# Patient Record
Sex: MALE | Race: ASIAN | NOT HISPANIC OR LATINO | Employment: OTHER | URBAN - METROPOLITAN AREA
[De-identification: names, ages, dates, MRNs, and addresses within clinical notes are randomized per-mention and may not be internally consistent; named-entity substitution may affect disease eponyms.]

---

## 2021-03-09 ENCOUNTER — TRANSFERRED RECORDS (OUTPATIENT)
Dept: HEALTH INFORMATION MANAGEMENT | Facility: CLINIC | Age: 71
End: 2021-03-09

## 2021-06-15 ENCOUNTER — OFFICE VISIT (OUTPATIENT)
Dept: FAMILY MEDICINE | Facility: CLINIC | Age: 71
End: 2021-06-15
Payer: MEDICARE

## 2021-06-15 VITALS
TEMPERATURE: 97.4 F | SYSTOLIC BLOOD PRESSURE: 137 MMHG | BODY MASS INDEX: 31.22 KG/M2 | HEIGHT: 60 IN | OXYGEN SATURATION: 97 % | WEIGHT: 159 LBS | DIASTOLIC BLOOD PRESSURE: 78 MMHG | HEART RATE: 62 BPM

## 2021-06-15 DIAGNOSIS — R05.9 COUGH: ICD-10-CM

## 2021-06-15 DIAGNOSIS — J30.1 SEASONAL ALLERGIC RHINITIS DUE TO POLLEN: ICD-10-CM

## 2021-06-15 DIAGNOSIS — Z11.59 ENCOUNTER FOR SCREENING FOR OTHER VIRAL DISEASES: ICD-10-CM

## 2021-06-15 DIAGNOSIS — Z12.11 SCREEN FOR COLON CANCER: ICD-10-CM

## 2021-06-15 DIAGNOSIS — E78.5 HYPERLIPIDEMIA, UNSPECIFIED HYPERLIPIDEMIA TYPE: ICD-10-CM

## 2021-06-15 DIAGNOSIS — I10 BENIGN ESSENTIAL HYPERTENSION: Primary | ICD-10-CM

## 2021-06-15 DIAGNOSIS — M1A.00X0 CHRONIC GOUTY ARTHROPATHY: ICD-10-CM

## 2021-06-15 DIAGNOSIS — I67.9 CEREBROVASCULAR DISEASE: ICD-10-CM

## 2021-06-15 PROBLEM — M1A.9XX0 CHRONIC GOUTY ARTHROPATHY: Status: ACTIVE | Noted: 2021-06-15

## 2021-06-15 LAB
ALBUMIN SERPL BCP-MCNC: 4.2 G/DL (ref 3.5–5)
ALP SERPL-CCNC: 63 U/L (ref 45–120)
ALT SERPL W/O P-5'-P-CCNC: 55 U/L (ref 0–45)
ANION GAP SERPL CALCULATED.3IONS-SCNC: 14 MMOL/L (ref 5–18)
AST SERPL-CCNC: 30 U/L (ref 0–40)
BILIRUB SERPL-MCNC: 0.5 MG/DL (ref 0–1)
BUN SERPL-MCNC: 20 MG/DL (ref 8–22)
CALCIUM SERPL-MCNC: 9.1 MG/DL (ref 8.5–10.5)
CHLORIDE SERPL-SCNC: 106 MMOL/L (ref 98–107)
CHOLEST SERPL-MCNC: 166 MG/DL
CO2 SERPL-SCNC: 18 MMOL/L (ref 22–31)
CREAT SERPL-MCNC: 1.29 MG/DL (ref 0.7–1.3)
FASTING?: NO
GLUCOSE SERPL-MCNC: 106 MG/DL (ref 70–125)
HDLC SERPL-MCNC: 40 MG/DL
LDLC SERPL CALC-MCNC: 102 MG/DL
POTASSIUM SERPL-SCNC: 4.2 MMOL/L (ref 3.5–5)
PROT SERPL-MCNC: 7.5 G/DL (ref 6–8)
SODIUM SERPL-SCNC: 138 MMOL/L (ref 136–145)
TRIGL SERPL-MCNC: 119 MG/DL
URATE SERPL-MCNC: 7.2 MG/DL (ref 3–8)

## 2021-06-15 PROCEDURE — 36415 COLL VENOUS BLD VENIPUNCTURE: CPT | Performed by: FAMILY MEDICINE

## 2021-06-15 PROCEDURE — 99204 OFFICE O/P NEW MOD 45 MIN: CPT | Performed by: FAMILY MEDICINE

## 2021-06-15 RX ORDER — LISINOPRIL 10 MG/1
10 TABLET ORAL DAILY
Qty: 90 TABLET | Refills: 1 | Status: SHIPPED | OUTPATIENT
Start: 2021-06-15 | End: 2022-02-14

## 2021-06-15 RX ORDER — ALLOPURINOL 100 MG/1
100 TABLET ORAL DAILY
Qty: 90 TABLET | Refills: 3 | Status: SHIPPED | OUTPATIENT
Start: 2021-06-15 | End: 2022-03-16

## 2021-06-15 RX ORDER — ALBUTEROL SULFATE 90 UG/1
2 AEROSOL, METERED RESPIRATORY (INHALATION) EVERY 6 HOURS
Qty: 18 G | Refills: 1 | Status: SHIPPED | OUTPATIENT
Start: 2021-06-15 | End: 2022-10-11

## 2021-06-15 RX ORDER — FEXOFENADINE HCL 180 MG/1
180 TABLET ORAL DAILY
Qty: 90 TABLET | Refills: 3 | Status: SHIPPED | OUTPATIENT
Start: 2021-06-15 | End: 2022-11-08

## 2021-06-15 RX ORDER — ASPIRIN 81 MG/1
81 TABLET, CHEWABLE ORAL DAILY
COMMUNITY
End: 2022-11-08

## 2021-06-15 RX ORDER — ATORVASTATIN CALCIUM 20 MG/1
20 TABLET, FILM COATED ORAL DAILY
Qty: 90 TABLET | Refills: 3 | Status: SHIPPED | OUTPATIENT
Start: 2021-06-15 | End: 2022-08-29

## 2021-06-15 ASSESSMENT — MIFFLIN-ST. JEOR: SCORE: 1340.59

## 2021-06-15 NOTE — PROGRESS NOTES
Assessment and Plan     1. Benign essential hypertension  Controlled. Continue current management. Recheck in 6 months.  - lisinopril (ZESTRIL) 10 MG tablet; Take 1 tablet (10 mg) by mouth daily  Dispense: 90 tablet; Refill: 1  - Comprehensive Metabolic ([facility]) - Results > 1 hr    2. Chronic gouty arthropathy  Controlled per history. Check uric acid. Return to clinic om 12 months for recheck or sooner if needed.  - Uric Acid ([facility])  - allopurinol (ZYLOPRIM) 100 MG tablet; Take 1 tablet (100 mg) by mouth daily  Dispense: 90 tablet; Refill: 3    3. Seasonal allergic rhinitis due to pollen  Uncontrolled. Restart H1 blocker. Possibly the reason for his cough complaint. Recheck in 2-4 weeks.  - fexofenadine (ALLEGRA) 180 MG tablet; Take 1 tablet (180 mg) by mouth daily  Dispense: 90 tablet; Refill: 3    4. Cough  See above. He doesn't describe any symptoms of chronic lung disease and it doesn't sound like he has ever performed spirometry after I described the test to him. Will refill at this time but I don't know that he will need it in the future. Continue to monitor. Discussed my thoughts with him.  - albuterol (PROAIR HFA/PROVENTIL HFA/VENTOLIN HFA) 108 (90 Base) MCG/ACT inhaler; Inhale 2 puffs into the lungs every 6 hours  Dispense: 18 g; Refill: 1    5. Cerebrovascular disease  We'll see what his lipids are. With his history of TIA, he might benefit from high intensity therapy.  - atorvastatin (LIPITOR) 20 MG tablet; Take 1 tablet (20 mg) by mouth daily  Dispense: 90 tablet; Refill: 3    6. Hyperlipidemia, unspecified hyperlipidemia type  See above.  - atorvastatin (LIPITOR) 20 MG tablet; Take 1 tablet (20 mg) by mouth daily  Dispense: 90 tablet; Refill: 3  - Lipid [place] ([facility]) - Results > 1 hr    7. Screen for colon cancer  - GASTROENTEROLOGY ADULT REF PROCEDURE ONLY    8. Encounter for screening for other viral diseases  - HIV Ag/Ab Screen [place] ([facility]); Future  - [facility] Antibody  ([facility]); Future  - Hepatitis B Surface Ag ([facility]); Future    Options for treatment and follow-up care were reviewed with the patient and/or guardian. Cher Rae and/or guardian engaged in the decision making process and verbalized understanding of the options discussed and agreed with the final plan. I answered all of their questions.    George Saenz III, MD, FAAFP  [facility] Faculty  21 2:12 PM           HPI:       Cher Rae is a 71 year old  male  Patient presents with:  Establish Care  Medication Reconciliation: pr brought in medicine     Has been back forth from [place] and [place] across the years. He originally immigrated to [place]. Has been back in [place] for about a year.    Needs refills of his medicines.    Tolerating his atorvastatin well. Had a TIA previously. Also taking an aspirin.    The patient denies signs and symptoms of orthostatic hypotension.    Has not had a gout flare since . Tolerating his allopurinol.    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    Would like an allergy pill. He cannot remember which one he was taking previously. Notes that his allergies are worse since moving back to [place].    Also needs a refill of his albuterol. He only uses this once or twice every two months. Doesn't describe ever performing a spirometry. Helps his cough when it gets really bad. Otherwise denies shortness of breath, wheezing, or chest pain.    He really likes to write. Writes lots of things about [facility] history and culture. Writes in a combination of [facility] and [place].    Has 11 children (one ) and 15 grandchildren (9 in [place]).    Had his colonoscopy 9 years ago. They told him to return in 5-10 years. Did have polyps removed.    Retired.    A [facility]  was used for this visit    Already has his COVID vaccine.         PMHX:     Patient Active Problem List   Diagnosis     Chronic Gout     Hyperlipidemia     Cerebrovascular disease      Benign Polyps Of The Large Intestine     Joint Pain, Localized In The Left Shoulder     Hyperlipidemia     Blurry Vision       Current Outpatient Medications   Medication Sig Dispense Refill     albuterol (PROAIR HFA/PROVENTIL HFA/VENTOLIN HFA) 108 (90 Base) MCG/ACT inhaler Inhale 2 puffs into the lungs every 6 hours 18 g 1     allopurinol (ZYLOPRIM) 100 MG tablet Take 1 tablet (100 mg) by mouth daily 90 tablet 3     aspirin (ASA) 81 MG chewable tablet Take 81 mg by mouth daily       atorvastatin (LIPITOR) 20 MG tablet Take 1 tablet (20 mg) by mouth daily 90 tablet 3     fexofenadine (ALLEGRA) 180 MG tablet Take 1 tablet (180 mg) by mouth daily 90 tablet 3     lisinopril (ZESTRIL) 10 MG tablet Take 1 tablet (10 mg) by mouth daily 90 tablet 1       Social History     Socioeconomic History     Marital status:      Spouse name: Not on file     Number of children: Not on file     Years of education: Not on file     Highest education level: Not on file   Occupational History     Not on file   Social Needs     Financial resource strain: Not on file     Food insecurity     Worry: Not on file     Inability: Not on file     Transportation needs     Medical: Not on file     Non-medical: Not on file   Tobacco Use     Smoking status: Never Smoker     Smokeless tobacco: Never Used   Substance and Sexual Activity     Alcohol use: Not on file     Drug use: Not on file     Sexual activity: Not on file   Lifestyle     Physical activity     Days per week: Not on file     Minutes per session: Not on file     Stress: Not on file   Relationships     Social connections     Talks on phone: Not on file     Gets together: Not on file     Attends [employer] service: Not on file     Active member of club or organization: Not on file     Attends meetings of clubs or organizations: Not on file     Relationship status: Not on file     Intimate partner violence     Fear of current or ex partner: Not on file     Emotionally abused: Not on  "file     Physically abused: Not on file     Forced sexual activity: Not on file   Other Topics Concern     Not on file   Social History Narrative     Not on file       No Known Allergies    Results for orders placed or performed in visit on 06/15/21 (from the past 24 hour(s))   Uric Acid ([employer])   Result Value Ref Range    Uric Acid 7.2 3.0 - 8.0 mg/dL    Narrative    Test performed by:  [facility]  [address] [place] [address]   Lipid [place] ([employer]) - Results > 1 hr   Result Value Ref Range    Cholesterol 166 <=199 mg/dL    Triglycerides 119 <=149 mg/dL    HDL Cholesterol 40 >=40 mg/dL    LDL Cholesterol Calculated 102 <=129 mg/dL    Fasting? No     Narrative    Test performed by:  [facility]  [address] [place] [address]   Comprehensive Metabolic ([facility]) - Results > 1 hr   Result Value Ref Range    Sodium 138 136 - 145 mmol/L    Potassium 4.2 3.5 - 5.0 mmol/L    Chloride 106 98 - 107 mmol/L    CO2, Total 18 (L) 22 - 31 mmol/L    Anion Gap 14 5 - 18 mmol/L    Glucose 106 70 - 125 mg/dL    Urea Nitrogen 20 8 - 22 mg/dL    Creatinine 1.29 0.70 - 1.30 mg/dL    GFR Estimate If Black >60 >60 mL/min/1.73m2    GFR Estimate 55 (L) >60 mL/min/1.73m2    Bilirubin Total 0.5 0.0 - 1.0 mg/dL    Calcium 9.1 8.5 - 10.5 mg/dL    Protein Total 7.5 6.0 - 8.0 g/dL    Albumin 4.2 3.5 - 5.0 g/dL    Alkaline Phosphatase 63 45 - 120 U/L    AST (SGOT) 30 0 - 40 U/L    ALT (SGPT) 55 (H) 0 - 45 U/L    Narrative    Test performed by:  [facility]  [address] [place] [address]  Fasting Glucose reference range is 70-99 mg/dL per  American Diabetes Association (ADA) guidelines.            Review of Systems:     ROS         Physical Exam:     Vitals:    06/15/21 1451   BP: 137/78   Pulse: 62   Temp: 97.4  F (36.3  C)   TempSrc: Oral   SpO2: 97%   Weight: 72.1 kg (159 lb)   Height: 1.535 m (5' 0.43\")     Body " mass index is 30.61 kg/m .    Physical Exam

## 2021-06-15 NOTE — NURSING NOTE
Due to patient being non-English speaking/uses sign language, an  was used for this visit. Only for face-to-face interpretation by an external agency, date and length of interpretation can be found on the scanned worksheet.     name: jersey solis  Agency: Rebecca Dias  Language: Hmong   Telephone number:   Type of interpretation: Telephone, spoken

## 2021-06-22 ENCOUNTER — TELEPHONE (OUTPATIENT)
Dept: FAMILY MEDICINE | Facility: CLINIC | Age: 71
End: 2021-06-22

## 2021-06-22 NOTE — TELEPHONE ENCOUNTER
Called patient to help schedule his colonoscopy, patient states he would like to wait till his other insurance becomes affective. States they will call the clinic.

## 2021-09-03 ENCOUNTER — TELEPHONE (OUTPATIENT)
Dept: FAMILY MEDICINE | Facility: CLINIC | Age: 71
End: 2021-09-03

## 2021-09-03 NOTE — TELEPHONE ENCOUNTER
PT's daughter called in asking for assistance in getting her dad's referral coordinated. Please contact her back at your earliest convenience to assist. Thank you.     Referral was in regards to a Gastroenterology

## 2021-09-09 NOTE — PATIENT INSTRUCTIONS
Referral for :     Colonoscopy    LOCATION/PLACE/Provider :    [facility] Saenz   DATE & TIME :    Sept. 23rd at 12:30  PHONE :       FAX :     Appointment made by clinic staff/:    Leatha

## 2021-09-10 DIAGNOSIS — Z11.59 ENCOUNTER FOR SCREENING FOR OTHER VIRAL DISEASES: ICD-10-CM

## 2021-09-15 ENCOUNTER — APPOINTMENT (OUTPATIENT)
Dept: INTERPRETER SERVICES | Facility: CLINIC | Age: 71
End: 2021-09-15
Payer: MEDICARE

## 2021-09-20 ENCOUNTER — LAB (OUTPATIENT)
Dept: FAMILY MEDICINE | Facility: CLINIC | Age: 71
End: 2021-09-20
Payer: MEDICARE

## 2021-09-20 DIAGNOSIS — Z20.822 COVID-19 RULED OUT: Primary | ICD-10-CM

## 2021-09-20 DIAGNOSIS — Z20.822 COVID-19 RULED OUT: ICD-10-CM

## 2021-09-20 PROCEDURE — U0005 INFEC AGEN DETEC AMPLI PROBE: HCPCS

## 2021-09-20 PROCEDURE — U0003 INFECTIOUS AGENT DETECTION BY NUCLEIC ACID (DNA OR RNA); SEVERE ACUTE RESPIRATORY SYNDROME CORONAVIRUS 2 (SARS-COV-2) (CORONAVIRUS DISEASE [COVID-19]), AMPLIFIED PROBE TECHNIQUE, MAKING USE OF HIGH THROUGHPUT TECHNOLOGIES AS DESCRIBED BY CMS-2020-01-R: HCPCS

## 2021-09-21 LAB — SARS-COV-2 RNA RESP QL NAA+PROBE: NEGATIVE

## 2021-09-22 ENCOUNTER — TELEPHONE (OUTPATIENT)
Dept: FAMILY MEDICINE | Facility: CLINIC | Age: 71
End: 2021-09-22

## 2021-09-22 ENCOUNTER — ANESTHESIA EVENT (OUTPATIENT)
Dept: SURGERY | Facility: AMBULATORY SURGERY CENTER | Age: 71
End: 2021-09-22
Payer: MEDICARE

## 2021-09-22 RX ORDER — OMEPRAZOLE 40 MG/1
CAPSULE, DELAYED RELEASE ORAL
COMMUNITY
End: 2022-11-08

## 2021-09-22 RX ORDER — LISINOPRIL 10 MG/1
TABLET ORAL
COMMUNITY
End: 2022-03-16

## 2021-09-22 RX ORDER — LORATADINE 10 MG/1
TABLET ORAL EVERY 24 HOURS
COMMUNITY
End: 2022-11-08

## 2021-09-22 RX ORDER — ATORVASTATIN CALCIUM 20 MG/1
TABLET, FILM COATED ORAL
COMMUNITY
End: 2022-03-16

## 2021-09-22 NOTE — TELEPHONE ENCOUNTER
Called patient and daughters number, left message on both VM. Trying to confirm patient colonoscopy appointment for tomorrow.

## 2021-09-23 ENCOUNTER — HOSPITAL ENCOUNTER (OUTPATIENT)
Facility: AMBULATORY SURGERY CENTER | Age: 71
End: 2021-09-23
Attending: FAMILY MEDICINE
Payer: MEDICARE

## 2021-09-23 ENCOUNTER — ANESTHESIA (OUTPATIENT)
Dept: SURGERY | Facility: AMBULATORY SURGERY CENTER | Age: 71
End: 2021-09-23
Payer: MEDICARE

## 2021-09-23 VITALS
WEIGHT: 152.5 LBS | RESPIRATION RATE: 16 BRPM | HEIGHT: 60 IN | OXYGEN SATURATION: 97 % | DIASTOLIC BLOOD PRESSURE: 82 MMHG | BODY MASS INDEX: 29.94 KG/M2 | HEART RATE: 65 BPM | TEMPERATURE: 96 F | SYSTOLIC BLOOD PRESSURE: 141 MMHG

## 2021-09-23 PROCEDURE — 45378 DIAGNOSTIC COLONOSCOPY: CPT | Performed by: FAMILY MEDICINE

## 2021-09-23 RX ORDER — OXYCODONE HYDROCHLORIDE 5 MG/1
5 TABLET ORAL EVERY 4 HOURS PRN
Status: DISCONTINUED | OUTPATIENT
Start: 2021-09-23 | End: 2021-09-24 | Stop reason: HOSPADM

## 2021-09-23 RX ORDER — ONDANSETRON 2 MG/ML
INJECTION INTRAMUSCULAR; INTRAVENOUS PRN
Status: DISCONTINUED | OUTPATIENT
Start: 2021-09-23 | End: 2021-09-23

## 2021-09-23 RX ORDER — LIDOCAINE HYDROCHLORIDE 20 MG/ML
INJECTION, SOLUTION INFILTRATION; PERINEURAL PRN
Status: DISCONTINUED | OUTPATIENT
Start: 2021-09-23 | End: 2021-09-23

## 2021-09-23 RX ORDER — PROPOFOL 10 MG/ML
INJECTION, EMULSION INTRAVENOUS CONTINUOUS PRN
Status: DISCONTINUED | OUTPATIENT
Start: 2021-09-23 | End: 2021-09-23

## 2021-09-23 RX ORDER — FENTANYL CITRATE 50 UG/ML
25 INJECTION, SOLUTION INTRAMUSCULAR; INTRAVENOUS
Status: DISCONTINUED | OUTPATIENT
Start: 2021-09-23 | End: 2021-09-24 | Stop reason: HOSPADM

## 2021-09-23 RX ORDER — SODIUM CHLORIDE, SODIUM LACTATE, POTASSIUM CHLORIDE, CALCIUM CHLORIDE 600; 310; 30; 20 MG/100ML; MG/100ML; MG/100ML; MG/100ML
INJECTION, SOLUTION INTRAVENOUS CONTINUOUS
Status: DISCONTINUED | OUTPATIENT
Start: 2021-09-23 | End: 2021-09-24 | Stop reason: HOSPADM

## 2021-09-23 RX ORDER — LIDOCAINE 40 MG/G
CREAM TOPICAL
Status: DISCONTINUED | OUTPATIENT
Start: 2021-09-23 | End: 2021-09-24 | Stop reason: HOSPADM

## 2021-09-23 RX ORDER — ONDANSETRON 4 MG/1
4 TABLET, ORALLY DISINTEGRATING ORAL EVERY 30 MIN PRN
Status: DISCONTINUED | OUTPATIENT
Start: 2021-09-23 | End: 2021-09-24 | Stop reason: HOSPADM

## 2021-09-23 RX ORDER — DEXAMETHASONE SODIUM PHOSPHATE 4 MG/ML
INJECTION, SOLUTION INTRA-ARTICULAR; INTRALESIONAL; INTRAMUSCULAR; INTRAVENOUS; SOFT TISSUE PRN
Status: DISCONTINUED | OUTPATIENT
Start: 2021-09-23 | End: 2021-09-23

## 2021-09-23 RX ORDER — ALLOPURINOL 100 MG/1
100 TABLET ORAL DAILY
COMMUNITY
End: 2022-03-16

## 2021-09-23 RX ORDER — FENTANYL CITRATE 50 UG/ML
25 INJECTION, SOLUTION INTRAMUSCULAR; INTRAVENOUS EVERY 5 MIN PRN
Status: DISCONTINUED | OUTPATIENT
Start: 2021-09-23 | End: 2021-09-24 | Stop reason: HOSPADM

## 2021-09-23 RX ORDER — MEPERIDINE HYDROCHLORIDE 25 MG/ML
12.5 INJECTION INTRAMUSCULAR; INTRAVENOUS; SUBCUTANEOUS
Status: DISCONTINUED | OUTPATIENT
Start: 2021-09-23 | End: 2021-09-24 | Stop reason: HOSPADM

## 2021-09-23 RX ORDER — ONDANSETRON 2 MG/ML
4 INJECTION INTRAMUSCULAR; INTRAVENOUS EVERY 30 MIN PRN
Status: DISCONTINUED | OUTPATIENT
Start: 2021-09-23 | End: 2021-09-24 | Stop reason: HOSPADM

## 2021-09-23 RX ADMIN — PROPOFOL 200 MCG/KG/MIN: 10 INJECTION, EMULSION INTRAVENOUS at 12:45

## 2021-09-23 RX ADMIN — LIDOCAINE HYDROCHLORIDE 60 ML: 20 INJECTION, SOLUTION INFILTRATION; PERINEURAL at 12:45

## 2021-09-23 RX ADMIN — DEXAMETHASONE SODIUM PHOSPHATE 4 MG: 4 INJECTION, SOLUTION INTRA-ARTICULAR; INTRALESIONAL; INTRAMUSCULAR; INTRAVENOUS; SOFT TISSUE at 12:47

## 2021-09-23 RX ADMIN — ONDANSETRON 4 MG: 2 INJECTION INTRAMUSCULAR; INTRAVENOUS at 12:47

## 2021-09-23 RX ADMIN — SODIUM CHLORIDE, SODIUM LACTATE, POTASSIUM CHLORIDE, CALCIUM CHLORIDE: 600; 310; 30; 20 INJECTION, SOLUTION INTRAVENOUS at 12:21

## 2021-09-23 ASSESSMENT — MIFFLIN-ST. JEOR: SCORE: 1308.21

## 2021-09-23 NOTE — LETTER
10/05/21      Cher Rae  [address]  [place] [address]    : 1950    Cher Rae,    The results from your colonoscopy showed no polyps and some internal hemorrhoids. Based on these results, current guidelines recommend repeat exam in 10 years for screening colonoscopy (in the year ).    Thank you coming to see me for your colonoscopy. Please let me know if there is any further information that you need.    Dr. Letty Saenz III, MD, FAAFP  Faculty Physician, [facility]    Department of Family Medicine and Community Health  [employer] [facility] [employer]    Office: [phone]

## 2021-09-23 NOTE — ANESTHESIA PREPROCEDURE EVALUATION
Anesthesia Pre-Procedure Evaluation    Patient: Cher Rae   MRN: [id number] : 1950        Preoperative Diagnosis: Colon cancer screening [Z12.11]   Procedure : Procedure(s):  COLONOSCOPY     Past Medical History:   Diagnosis Date     Anemia      Cerebral artery occlusion with cerebral infarction (H)     No residual from stroke     Gastroesophageal reflux disease      Hypertension       Past Surgical History:   Procedure Laterality Date     COLONOSCOPY        No Known Allergies   Social History     Tobacco Use     Smoking status: Never Smoker     Smokeless tobacco: Never Used   Substance Use Topics     Alcohol use: Yes      Wt Readings from Last 1 Encounters:   21 69.2 kg (152 lb 8 oz)        Anesthesia Evaluation   Pt has had prior anesthetic. Type: MAC.    No history of anesthetic complications       ROS/MED HX  ENT/Pulmonary:  - neg pulmonary ROS     Neurologic:  - neg neurologic ROS     Cardiovascular:     (+) hypertension-----    METS/Exercise Tolerance:     Hematologic:  - neg hematologic  ROS     Musculoskeletal:  - neg musculoskeletal ROS     GI/Hepatic:     (+) GERD,     Renal/Genitourinary:  - neg Renal ROS     Endo:  - neg endo ROS     Psychiatric/Substance Use:  - neg psychiatric ROS     Infectious Disease:  - neg infectious disease ROS     Malignancy:       Other:            Physical Exam    Airway        Mallampati: II   TM distance: > 3 FB   Neck ROM: full     Respiratory Devices and Support         Dental           Cardiovascular          Rhythm and rate: regular and normal     Pulmonary           breath sounds clear to auscultation           OUTSIDE LABS:  CBC: No results found for: WBC, HGB, HCT, PLT  BMP: No results found for: NA, POTASSIUM, CHLORIDE, CO2, BUN, CR, GLC  COAGS: No results found for: PTT, INR, FIBR  POC: No results found for: BGM, HCG, HCGS  HEPATIC: No results found for: ALBUMIN, PROTTOTAL, ALT, AST, GGT, ALKPHOS, BILITOTAL, BILIDIRECT, TIA  OTHER: No results  found for: PH, LACT, A1C, MADONNA, PHOS, MAG, LIPASE, AMYLASE, TSH, T4, T3, CRP, SED    Anesthesia Plan    ASA Status:  2      Anesthesia Type: MAC.              Consents    Anesthesia Plan(s) and associated risks, benefits, and realistic alternatives discussed. Questions answered and patient/representative(s) expressed understanding.     - Discussed with:  Patient         Postoperative Care            Comments:                Rosa Patel MD

## 2021-09-23 NOTE — ANESTHESIA POSTPROCEDURE EVALUATION
Patient: Cher Rae    Procedure(s):  COLONOSCOPY    Diagnosis:Colon cancer screening [Z12.11]  Diagnosis Additional Information: No value filed.    Anesthesia Type:  MAC    Note:  Disposition: Outpatient   Postop Pain Control: Uneventful            Sign Out: Well controlled pain   PONV: No   Neuro/Psych: Uneventful            Sign Out: Acceptable/Baseline neuro status   Airway/Respiratory: Uneventful            Sign Out: Acceptable/Baseline resp. status   CV/Hemodynamics: Uneventful            Sign Out: Acceptable CV status; No obvious hypovolemia; No obvious fluid overload   Other NRE:    DID A NON-ROUTINE EVENT OCCUR?            Last vitals:  Vitals Value Taken Time   /76 09/23/21 1400   Temp 96  F (35.6  C) 09/23/21 1352   Pulse 65 09/23/21 1408   Resp 16 09/23/21 1400   SpO2 97 % 09/23/21 1408   Vitals shown include unvalidated device data.    Electronically Signed By: Rosa Patel MD  September 23, 2021  2:12 PM

## 2021-09-23 NOTE — OP NOTE
COLONOSCOPY OPERATION REPORT     OPERATION PERFORMED:  Colonoscopy with MAC  DATE OF OPERATION: 23 September 2021  SURGEON(S): George Saenz III, MD, FAAFP    ASSISTANT(S): Marie Jaimes MD    Preoperative Diagnosis: Encounter for colonoscopy following colon polyp removal [Z09, Z86.010]    Postoperative Diagnosis: Internal Hemorrhoids      History:    70yom here for repeat colonoscopy. States he had a colonoscopy greater than 10 years ago and that there were polyps. Maybe he was told to follow up in 5-10. Denies symptoms. No family history of colon cancer.        Shortly Before Procedure  Time out was completed. Correct patient ID, procedure verified, positioning verified, implants/equipment available, studies available as needed. Consents signed and on the chart. Emergency resuscitation equipment available if needed. TYPE OF ANESTHESIA:  MAC. Patient monitored in the usual fashion with pulse ox, NIBP, and EKG. See flowsheet for full details.      DESCRIPTION OF OPERATION:  Assuring patient comfort, a rectal exam revealed normal sphincter tone, no masses, no stool in the rectal vault. The video colonoscope was passed from anus to cecum under direct visualization with expected amount of difficulty. The cecum was identified by the ileocecal valve, appendiceal orifice, and crows foot. Mucosa was inspected > 6 minute scope withdrawal.         Findings   Prep:    Excellent   EBL:                     < 5 mL   Terminal Ileum: Normal appearance   Cecum:                     Normal appearance.   Ascending Colon:    Mild diverticulosis       Transverse Colon:   Normal appearance.        Desc. Colon: Normal appearance.       Sigmoid Colon:        Moderate diverticulosis        Rectum:                    Moderate internal hemorrhoids       Complications: None.     Polyps submitted:                     0   Diverticulosis:    None   Internal Hemorrhoids: Moderate   External Hemorrhoids: None       TIMES time  min    PROCEDURE START 1249 TOTAL PROCEDURE TIME 57   CECUM REACHED 1255 TIME TO CECUM 6   PROCEDURE STOP 1346 WITHDRAWAL TIME 51       DISPOSITION   Follow up/Repeat Colonoscopy:                     Repeat colonoscopy in 10 years.             George Saenz III, MD, FAAFP

## 2021-09-23 NOTE — DISCHARGE INSTRUCTIONS
Colonoscopy Discharge Instructions  When You Leave Today:  The medications you received today may affect your short-term memory, balance/coordination, and reflexes. It may cause drowsiness, slow reflexes, and impaired thoughts. We recommend that you go home and rest for the remainder of the day. Do NOT DRIVE, go back to work or do anything that requires a clear thought process. Examples include but are not limited to: Do NOT drive, operate any machines, make important personal or work decisions, or sign legal documentation for 24 hours.      You may feel bloated because of the air that was introduced during the exam. Flatulence and belching is expected. If you feel like the air isn't coming out easily you can try laying on your right side to help the air move in the correct direction. Holding in the air can cause abdominal pain, cramping, and in some cases nausea. Let the gas pass!!    You may eat and drink what you can tolerate after your exam.  We recommend that you start with something light and progress as tolerated. You will be gassy, so avoiding things that create more gas is beneficial. This includes things like beans, carbonated beverages, and whole vegetables like broccoli/brussel sprouts/cabbage.     AVOID alcohol for 24 hours. This could have an adverse reaction mixed with the sedative.       You may resume your normal home medications unless told otherwise. Do not double up on any missed doses.        Avoid Aspirin and Aspirin containing products for 3 days if biopsies were taken. This will allow the areas to heal and will decrease your risk of bleeding.      Watch the area where your IV was inserted. If there is any swelling, severe pain or the area feels hot, place a warm, wet cloth over the area for 10-20 minutes. A small bruise is normal. If you continue to have discomfort, contact your doctor.      If you use a CPAP at home, please use it for the next 24-48 hours.    If any of the following  occur, please go to your CLOSEST EMERGENCY ROOM.      Increasing abdominal pain (if you are unable to pass gas or doubled over in pain)    Fever of 101.5 degrees or higher    Bleeding (a small amount of bleeding is normal when wiping if you have hemorrhoids or we remove tissue samples. However if you are filling the toilet with bright red blood, this is an emergency)    Call the [facility] [phone] if you have any questions or concerns regarding your procedure.  The [facility] is open from 8am to 5pm Monday through Friday.  DO not come to the clinic for severe post procedure complications, go to your closest ER.     Colonoscopy:     Biopsies Taken   X Normal- Follow up__10 years___ (unless condition changes or treatment guidelines change)   X Diverticulosis   X Hemorrhoids X Internal  External   X High Fiber Diet           Other _______________________________________________________________    If biopsies were taken:  A letter will be mailed to you with your biopsy results and further recommendations in approximately   1-2 weeks.  If you have not received a letter within 3 weeks, please call the [facility] [phone].

## 2021-09-23 NOTE — ANESTHESIA CARE TRANSFER NOTE
Patient: Cher Rae    Procedure(s):  COLONOSCOPY    Diagnosis: Colon cancer screening [Z12.11]  Diagnosis Additional Information: No value filed.    Anesthesia Type:   MAC     Note:    Oropharynx: oropharynx clear of all foreign objects  Level of Consciousness: awake and drowsy  Oxygen Supplementation: face mask  Level of Supplemental Oxygen (L/min / FiO2): 8  Independent Airway: airway patency satisfactory and stable  Dentition: dentition unchanged  Vital Signs Stable: post-procedure vital signs reviewed and stable  Report to RN Given: handoff report given  Patient transferred to: Phase II    Handoff Report: Identifed the Patient, Identified the Reponsible Provider, Reviewed the pertinent medical history, Discussed the surgical course, Reviewed Intra-OP anesthesia mangement and issues during anesthesia, Set expectations for post-procedure period and Allowed opportunity for questions and acknowledgement of understanding      Vitals:  Vitals Value Taken Time   /73 09/23/21 1352   Temp 96  F (35.6  C) 09/23/21 1352   Pulse 60    Resp 16 09/23/21 1352   SpO2 100 % 09/23/21 1352       Electronically Signed By: SEN Rondon CRNA  September 23, 2021  1:55 PM

## 2021-09-28 ENCOUNTER — OFFICE VISIT (OUTPATIENT)
Dept: FAMILY MEDICINE | Facility: CLINIC | Age: 71
End: 2021-09-28
Payer: MEDICARE

## 2021-09-28 VITALS
BODY MASS INDEX: 31.03 KG/M2 | HEART RATE: 67 BPM | HEIGHT: 60 IN | SYSTOLIC BLOOD PRESSURE: 121 MMHG | WEIGHT: 158.08 LBS | OXYGEN SATURATION: 98 % | RESPIRATION RATE: 16 BRPM | DIASTOLIC BLOOD PRESSURE: 77 MMHG | TEMPERATURE: 97.9 F

## 2021-09-28 DIAGNOSIS — Z23 NEED FOR PROPHYLACTIC VACCINATION AND INOCULATION AGAINST INFLUENZA: ICD-10-CM

## 2021-09-28 DIAGNOSIS — Z00.00 MEDICARE ANNUAL WELLNESS VISIT, INITIAL: Primary | ICD-10-CM

## 2021-09-28 DIAGNOSIS — Z00.00 WELLNESS EXAMINATION: Primary | ICD-10-CM

## 2021-09-28 PROCEDURE — 99207 PR NO BILLABLE SERVICE THIS VISIT: CPT

## 2021-09-28 PROCEDURE — 90662 IIV NO PRSV INCREASED AG IM: CPT | Performed by: FAMILY MEDICINE

## 2021-09-28 PROCEDURE — 90472 IMMUNIZATION ADMIN EACH ADD: CPT | Performed by: FAMILY MEDICINE

## 2021-09-28 PROCEDURE — 90471 IMMUNIZATION ADMIN: CPT | Performed by: FAMILY MEDICINE

## 2021-09-28 PROCEDURE — 99397 PER PM REEVAL EST PAT 65+ YR: CPT | Mod: 25 | Performed by: FAMILY MEDICINE

## 2021-09-28 PROCEDURE — 90732 PPSV23 VACC 2 YRS+ SUBQ/IM: CPT | Performed by: FAMILY MEDICINE

## 2021-09-28 ASSESSMENT — MIFFLIN-ST. JEOR: SCORE: 1333.3

## 2021-09-28 NOTE — PATIENT INSTRUCTIONS
PERSONAL PREVENTIVE SERVICES PLAN - SERVICES     Review these tests with your medical staff then decide which ones you want and take this page home for your reference      SCREENING TESTS     Description   Year of Last Screening   Recommended Today?   Heart disease screening blood tests    Cholesterol level Reducing cholesterol can reduce your risk of heart attacks by 25%.  Screening is recommended yearly if you are at risk of heart disease otherwise every 4-5 years 6/15/2021 No: is not indicated today.   Diabetes screening tests    Hemoglobin A1c blood test   Finding and treating diabetes early can reduce complications.  Screening recommended/covered yearly if you have high blood pressure, high cholesterol, obesity (BMI >30), or a history of high blood glucose tests; or 2 of the following: family history of diabetes, overweight (BMI >25 but <30), age 65 years or older, and a history of diabetes of pregnancy or gave birth to baby weighing more than 9 lbs. 6/15/2021  CMP,glucose- 106 No: is not indicated today.   Hepatitis B screening Finding hepatitis B early can reduce complications.  Screening is recommended for persons with selected risk factors.  No: is not indicated today.   Hepatitis C screening Finding hepatitis C early can reduce complications.  Screening is recommended for all persons born from 1945 through 1965 and for those with selected other risk factors.   Yes; Recommended    HIV screening Finding HIV early can reduce complications.  Screening is recommended for persons with risk factors for HIV infection.  No: is not indicated today.   Glaucoma screening Early detection of glaucoma can prevent blindness.   Please talk to your eye doctor about this.       SCREENING TESTS     Description   Year of Last Screening   Recommended Today?   Colorectal cancer screening    Fecal occult blood test     Screening colonoscopy Screening for colon cancer has been shown to reduce death from colon cancer by 25-30%.  Screening recommended to start at 50 years and continuing until age 75 years.    Yes; Recommended. 6/22/21 patient wanted to check with insurance coverage first.   Breast Cancer Screening (women)    Mammogram Mammogram screening for breast cancer has been shown to reduce the risk of dying from breast cancer and prolong life. Screening is recommended every 1-2 years for women aged 50 to 74 years.   No: is not indicated today.   Cervical Cancer screening (women)    Pap Cervical pap smears can reduce cervical cancer. Screening is recommended annually if high risk (history of abnormal pap smears) otherwise every 2-3 years, stop screening at 65 years of age if history of normal paps.  No: is not indicated today.   Screening for Osteoporosis:  Bone mass measurements (women)    Dexa Scan Screening and treating Osteoporosis can reduce the risk of hip and spine fractures. Screening is recommended in women 65 years or older and in women and men at risk of osteoporosis.  No: is not indicated today.   Screening for Lung Cancer     Low-dose CT scanning Screening can reduce mortality in persons aged 55-80 who have smoked at least 30 pack-years and who are either still smoking or have quit in the past 15 years.  No: is not indicated today.   Abdominal Aortic Aneurysm (AAA) screening    Ultrasound (US)   An aneurysm treated before rupture is very safe -a ruptured aneurysm can be fatal.  Screening  by US for AAA is limited to patients who meet one of the following criteria:    Men who are 65-75 years old and have smoked more than 100 cigarettes in their lifetime    Anyone with a family history of abdominal aortic aneurysm  No: is not indicated today.     Here are your recommended immunizations.  Take this home for your reference.                                                    IMMUNIZATIONS Description Recommend today?     Influenza (Flu shot) Prevents flu; should get every year Yes; Recommended    PCV 13 Pneumonia vaccination;  you get it once Yes; Recommended    PPSV 23 Second pneumonia vaccination; usually get it 1 year after PCV 13 No: is not indicated today.   Zoster (Shingles) Prevents shingles; you get it once  (Check with Part D insurance for coverage, must receive at a pharmacy, not clinic) Yes; Recommended    Tetanus Prevents tetanus; once every 10 years No; is up to date.     Hepatitis B  If you have any of the following risk factors you should be immunized for hepatitis B: severe kidney disease, people who live in the same house as a carrier of Hepatitis B virus, people who live in  institutions (e.g. nursing homes or group homes), homosexual men, patients with hemophilia who received Factor VIII or IX concentrates, abusers of illicit injectable drugs No: is not indicated today.      PATIENT INSTRUCTIONS    Yearly exam:     See your health care provider every year in order to review changes in your health, review medicines that you take, and discuss preventive care needs such as immunizations and cancer screening.    Get a flu shot each year.     Advance Directives:    If you have not done so, you are encouraged to complete advance directives and/or a living will.   More information about advance directives can be found at: http://www.[place]med.org/advocacy/Key-Issues/Advance-Directives    Nutrition:     Eat at least 5 servings of fruits and vegetables each day.     Eat whole-grain bread, whole-wheat pasta and brown rice instead of white grains and rice.     Talk to your doctor about Calcium and Vitamin D.     Lifestyle:    Exercise for at least 150 minutes a week (30 minutes a day, 5 days a week). This will help you control your weight and prevent disease.     Limit alcohol to one drink per day.     If you smoke, try to quit - your doctor will be happy to help.     Wear sunscreen to prevent skin cancer.     See your dentist every six months for an exam and cleaning.     See your eye doctor every 1 to 2 years to screen for  conditions such as glaucoma, macular degeneration and cataracts.

## 2021-09-28 NOTE — NURSING NOTE
Due to patient being non-English speaking/uses sign language, an  was used for this visit. Only for face-to-face interpretation by an external agency, date and length of interpretation can be found on the scanned worksheet.     name: Pamela martin  Agency: [employer]  Language: Cem   Telephone number: [phone]  Type of interpretation: Face-to-face, spoken

## 2021-09-28 NOTE — PROGRESS NOTES
Annual Wellness Visit for 65 years and older    HPI  This 71 year old male presents as an established patient  George Saenz who presents for an annual wellness visit.    Other issues patient wants to be addressed today:  Chief Complaint   Patient presents with     Wellness Visit     Medication Reconciliation     Imm/Inj     Flu Shot       Patient Active Problem List   Diagnosis     Chronic Gout     Hyperlipidemia     Cerebrovascular disease     Benign Polyps Of The Large Intestine     Joint Pain, Localized In The Left Shoulder     Hyperlipidemia     Blurry Vision     History reviewed. No pertinent past medical history.    History reviewed. No pertinent family history.  Problem List, Family History and past Medical History reviewed and  unchanged/updated.    Nursing Notes:   Melanie Mansfield, [facility]  9/28/2021  3:48 PM  Signed  Due to patient being non-English speaking/uses sign language, an  was used for this visit. Only for face-to-face interpretation by an external agency, date and length of interpretation can be found on the scanned worksheet.     name: Pamela martin  Agency: [employer]  Language: [facility]   Telephone number: [phone]  Type of interpretation: Face-to-face, spoken          Are you sexually active?  Yes    If yes, with men, women, or both? Female  If yes, do you more than one current partner?No  If yes, are you using condoms?  No  Have you had any sexually transmitted infections? No   Any sexual concerns? No       Frailty Assessment    1. Weight loss (>5% in year)  No  Wt Readings from Last 5 Encounters:   09/28/21 71.7 kg (158 lb 1.3 oz)   06/15/21 72.1 kg (159 lb)       2. Exhaustion (perceived effort for a given activity)   How difficult is walking from one room to the other on the same level?not   No     3. Weakness (handgrip strength)   How difficult is lifting or carrying something as heavy as 10 pounds? mildly   No    4. Decreased physical activity    Compared with most  (men/women) your age, would you say  that you are more active, less active, or about the same? same   No    5. Slow gait speed (timed up and go > 12 sec.)  Yes  FALL RISK ASSESSMENT 9/28/2021   Fallen 2 or more times in the past year? No   Any fall with injury in the past year? No   Timed Up and Go Test/Seconds (13.5 is a fall risk; contact physician) 13         Frailty screen score: 1    Frail Assessment:1-2 Prefrail    EVALUATION OF COGNITIVE FUNCTION  Mood/affect:Normal  Appearance:Normal  Family member/caregiver input: N/A, in clinic alone    PHQ-2 Score:   PHQ-2 ( 1999 Pfizer) 9/28/2021   Q1: Little interest or pleasure in doing things 0   Q2: Feeling down, depressed or hopeless 0   PHQ-2 Score 0       PHQ-9 Score:   No flowsheet data found.    Mini Cog Test:  Unable to complete due to language barrier. No concerns about his memory given his interactions and history.    Cognitive screen is:Negative    Other Assessments:  CV Risk based on Pooled Cohort Risk   The 10-year ASCVD risk score (Greyson MORGAN Jr., et al., 2013) is: 17.9%    Values used to calculate the score:      Age: 71 years      Sex: Male      Is Non- : No      Diabetic: No      Tobacco smoker: No      Systolic Blood Pressure: 121 mmHg      Is BP treated: Yes      HDL Cholesterol: 40 mg/dL      Total Cholesterol: 166 mg/dL    Colon CA Screening (>50-75 ) (FITT annually or colonoscopy every 10years):  Date done 23 Sep 21  Result(s) Normal, repeat in 10    ECG (if done)not performed    Corrected Visual acuity: N/A    Advance Directives: Discussed with patient and family as appropriate. Has patient completed advance directives and/or a living will? No - worksheet given by RN.      Immunization History   Administered Date(s) Administered     COVID-19,PF,Moderna 02/13/2021, 03/13/2021     FLU 6-35 months 12/03/2009     Flu, Unspecified 12/03/2009, 10/28/2010     Influenza (H1N1) 01/22/2010     Influenza (IIV3) PF 10/28/2010,  "10/09/2012, 09/19/2013     Influenza Vaccine, 6+MO IM (QUADRIVALENT W/PRESERVATIVES) 10/09/2012, 09/19/2013     Influenza, Quad, High Dose, Pf, 65yr+ (Fluzone HD) 10/16/2020, 09/28/2021     Pneumococcal 23 valent 09/28/2021     Tdap (Adacel,Boostrix) 10/31/2013     Reviewed Immunization Record Today  Physical Exam  Vitals: /77 (BP Location: Right arm, Cuff Size: Adult Regular)   Pulse 67   Temp 97.9  F (36.6  C) (Oral)   Resp 16   Ht 1.53 m (5' 0.24\")   Wt 71.7 kg (158 lb 1.3 oz)   SpO2 98%   BMI 30.63 kg/m    BMI= Body mass index is 30.63 kg/m .  EXAM:  Constitutional: healthy, alert and no distress     Assessment and Plan:  Reviewed Preventive Services and Plan form with patient as specified in  Patient Instructions.  Positive findings on assessment: Just completed colonoscopy with me last week.   Cher was seen today for wellness visit, medication reconciliation and imm/inj.    Diagnoses and all orders for this visit:    Medicare annual wellness visit, initial  -     Pneumococcal vaccine 23 valent PPSV23  (Pneumovax) [95411]    Need for prophylactic vaccination and inoculation against influenza  -     INFLUENZA, QUAD, HIGH DOSE, PF, 65YR + (FLUZONE HD)        Options for treatment and follow-up care were reviewed with the Cher ARCE Rae and/or guardian engaged in the decision making process and verbalized understanding of the options discussed and agreed with the final plan.      George Saenz III, MD, FAAFP  [facility] Faculty  09/30/21 1:44 PM    "

## 2021-10-05 NOTE — PROGRESS NOTES
10/05/21    RE: CHER MERINO, : 1950    George Saenz    I have performed the colonoscopy on Cher Merino and found no polyps and some internal hemorrhoids. Based on the pathology, current guidelines recommend repeat exam in 10 years for screening colonoscopy (in the year ).    Your patient has been informed of the results and recommended follow up plan.    Thank you for the referral. Please let me know if there is any further information that you need.    Very Respectfully,    Yash Saenz III, MD, FAAFP  Faculty Physician, [facility]    Department of Family Medicine and Community Health  [employer] [facility] [employer]    Office: [phone]

## 2021-11-09 ENCOUNTER — IMMUNIZATION (OUTPATIENT)
Dept: FAMILY MEDICINE | Facility: CLINIC | Age: 71
End: 2021-11-09
Payer: MEDICARE

## 2021-11-09 PROCEDURE — 91306 COVID-19,PF,MODERNA (18+ YRS BOOSTER .25ML): CPT

## 2021-11-09 PROCEDURE — 0064A COVID-19,PF,MODERNA (18+ YRS BOOSTER .25ML): CPT

## 2022-02-12 DIAGNOSIS — I10 BENIGN ESSENTIAL HYPERTENSION: ICD-10-CM

## 2022-02-14 RX ORDER — LISINOPRIL 10 MG/1
TABLET ORAL
Qty: 45 TABLET | Refills: 0 | Status: SHIPPED | OUTPATIENT
Start: 2022-02-14 | End: 2022-03-16

## 2022-02-14 NOTE — TELEPHONE ENCOUNTER
Patient due for follow up next month. Small refill provided. Please schedule.    George Saenz III, MD, FAAFP  [facility] Residency Faculty  02/14/22 9:43 AM

## 2022-02-15 NOTE — TELEPHONE ENCOUNTER
Marcos DELANEY:  Called pt and informed of appt needed. Scheduled as requested.  Demetra Martínez, EMT  February 15, 2022  1:55 PM

## 2022-03-16 ENCOUNTER — OFFICE VISIT (OUTPATIENT)
Dept: FAMILY MEDICINE | Facility: CLINIC | Age: 72
End: 2022-03-16
Payer: MEDICARE

## 2022-03-16 VITALS
TEMPERATURE: 98.2 F | RESPIRATION RATE: 18 BRPM | DIASTOLIC BLOOD PRESSURE: 85 MMHG | HEIGHT: 60 IN | BODY MASS INDEX: 32.2 KG/M2 | SYSTOLIC BLOOD PRESSURE: 143 MMHG | OXYGEN SATURATION: 98 % | HEART RATE: 65 BPM | WEIGHT: 164 LBS

## 2022-03-16 DIAGNOSIS — J30.1 SEASONAL ALLERGIC RHINITIS DUE TO POLLEN: ICD-10-CM

## 2022-03-16 DIAGNOSIS — M1A.00X0 CHRONIC GOUTY ARTHROPATHY: ICD-10-CM

## 2022-03-16 DIAGNOSIS — I10 BENIGN ESSENTIAL HYPERTENSION: Primary | ICD-10-CM

## 2022-03-16 PROCEDURE — 99215 OFFICE O/P EST HI 40 MIN: CPT | Performed by: FAMILY MEDICINE

## 2022-03-16 RX ORDER — AZELASTINE HYDROCHLORIDE 0.5 MG/ML
SOLUTION/ DROPS OPHTHALMIC
COMMUNITY
End: 2022-03-16

## 2022-03-16 RX ORDER — AZELASTINE HYDROCHLORIDE 0.5 MG/ML
1 SOLUTION/ DROPS OPHTHALMIC 2 TIMES DAILY PRN
Qty: 6 ML | Refills: 11 | Status: SHIPPED | OUTPATIENT
Start: 2022-03-16 | End: 2023-03-22

## 2022-03-16 RX ORDER — ALLOPURINOL 100 MG/1
200 TABLET ORAL DAILY
Qty: 180 TABLET | Refills: 3 | Status: SHIPPED | OUTPATIENT
Start: 2022-03-16 | End: 2022-11-08

## 2022-03-16 RX ORDER — FLUTICASONE PROPIONATE 50 MCG
SPRAY, SUSPENSION (ML) NASAL
COMMUNITY
End: 2023-03-22

## 2022-03-16 RX ORDER — LISINOPRIL 10 MG/1
10 TABLET ORAL DAILY
Qty: 90 TABLET | Refills: 1 | Status: SHIPPED | OUTPATIENT
Start: 2022-03-16 | End: 2022-09-22

## 2022-03-16 RX ORDER — COLCHICINE 0.6 MG/1
0.6 TABLET ORAL DAILY
Qty: 30 TABLET | Refills: 0 | Status: SHIPPED | OUTPATIENT
Start: 2022-03-16 | End: 2022-11-08

## 2022-03-16 RX ORDER — LIDOCAINE 5 G/100G
CREAM RECTAL; TOPICAL
COMMUNITY
End: 2022-11-08

## 2022-03-16 NOTE — PROGRESS NOTES
Assessment and Plan     1. Benign essential hypertension  Home BPs are great per report and his BP here is at goal per JNC8. Recheck in 6 months with hans.  - lisinopril (ZESTRIL) 10 MG tablet; Take 1 tablet (10 mg) by mouth daily  Dispense: 90 tablet; Refill: 1    2. Chronic gouty arthropathy  He hasn't had a flare in awhile. Discussed the natural history of the disease. We discussed the goal uric acid and where his was last time. He is interested in getting to goal so we will increase his allopurinol and prophylax with colchicine with his personal history of ulcers. Recheck uric acid and next draw. Hopefully, he'll be at goal with this increase.  - allopurinol (ZYLOPRIM) 100 MG tablet; Take 2 tablets (200 mg) by mouth daily  Dispense: 180 tablet; Refill: 3  - colchicine (COLCYRS) 0.6 MG tablet; Take 1 tablet (0.6 mg) by mouth daily  Dispense: 30 tablet; Refill: 0    3. Seasonal allergic rhinitis due to pollen  Refilled.  - azelastine (OPTIVAR) 0.05 % ophthalmic solution; Place 1 drop into both eyes 2 times daily as needed (Itchy eyes)  Dispense: 6 mL; Refill: 11    47 minutes spent on the date of the encounter doing chart review, history and exam, documentation, and further activities as noted above.    Options for treatment and follow-up care were reviewed with the patient and/or guardian. Cher Rae and/or guardian engaged in the decision making process and verbalized understanding of the options discussed and agreed with the final plan. I answered all of their questions.    George Saenz III, MD, FAAFP  [facility] [place] Residency Faculty  03/16/22 9:40 AM           HPI:       Cher Rae is a 71 year old  male  Patient presents with:  Follow Up: Bp- refill meds   Follow Up: has had a poor apetite the last few days      The patient denies signs and symptoms of orthostatic hypotension. Taking his lisinopril. Last home blood pressure (yesterday) was 108-109/70-80.    Never smoker.    No recent gout flares.  Takes his allopurinol daily. Has gotten ulcers from NSAIDs before.    Uric Acid   Date Value Ref Range Status   06/15/2021 7.2 3.0 - 8.0 mg/dL Final     A [employer]  was used for this visit         PMHX:     Patient Active Problem List   Diagnosis     Chronic Gout     Hyperlipidemia     Cerebrovascular disease     Benign Polyps Of The Large Intestine     Joint Pain, Localized In The Left Shoulder     Hyperlipidemia     Blurry Vision     Benign essential hypertension     Seasonal allergic rhinitis due to pollen       Current Outpatient Medications   Medication Sig Dispense Refill     allopurinol (ZYLOPRIM) 100 MG tablet Take 2 tablets (200 mg) by mouth daily 180 tablet 3     aspirin (ASA) 81 MG chewable tablet Take 81 mg by mouth daily       atorvastatin (LIPITOR) 20 MG tablet Take 1 tablet (20 mg) by mouth daily 90 tablet 3     azelastine (OPTIVAR) 0.05 % ophthalmic solution Place 1 drop into both eyes 2 times daily as needed (Itchy eyes) 6 mL 11     colchicine (COLCYRS) 0.6 MG tablet Take 1 tablet (0.6 mg) by mouth daily 30 tablet 0     lisinopril (ZESTRIL) 10 MG tablet Take 1 tablet (10 mg) by mouth daily 90 tablet 1     albuterol (PROAIR HFA/PROVENTIL HFA/VENTOLIN HFA) 108 (90 Base) MCG/ACT inhaler Inhale 2 puffs into the lungs every 6 hours 18 g 1     fexofenadine (ALLEGRA) 180 MG tablet Take 1 tablet (180 mg) by mouth daily 90 tablet 3     fluticasone (FLONASE) 50 MCG/ACT nasal spray fluticasone propionate 50 mcg/actuation nasal spray,suspension   USE 2 SPRAY(S) IN EACH NOSTRIL ONCE DAILY       lidocaine, Anorectal, 5 % CREA lidocaine 5 % topical cream   Apply 1 application 3 times a day by topical route as needed.       loratadine (CLARITIN) 10 MG tablet every 24 hours       omeprazole (PRILOSEC) 40 MG DR capsule omeprazole 40 mg capsule,delayed release   TAKE 1 CAPSULE BY MOUTH ONCE DAILY         Social History     Socioeconomic History     Marital status:      Spouse name: Not on file      "Number of children: Not on file     Years of education: Not on file     Highest education level: Not on file   Occupational History     Not on file   Tobacco Use     Smoking status: Never Smoker     Smokeless tobacco: Never Used   Vaping Use     Vaping Use: Never used   Substance and Sexual Activity     Alcohol use: Not Currently     Drug use: Not Currently     Sexual activity: Not on file   Other Topics Concern     Not on file   Social History Narrative    ** Merged History Encounter **         Has been back forth from [place] and [place] across the years. He originally immigrated to [place]. Has been back in [place] since summer 2020.    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    He really likes to write. Writes lots     of things about [facility] history.    Has 11 children (one ) and 15 grandchildren (9 in [place]).    Had his colonoscopy 9 years ago. They told him to return in 5-10 years. Did have polyps removed.    Retired.     Social Determinants of Health     Financial Resource Strain: Not on file   Food Insecurity: Not on file   Transportation Needs: Not on file   Physical Activity: Not on file   Stress: Not on file   Social Connections: Not on file   Intimate Partner Violence: Not on file   Housing Stability: Not on file       No Known Allergies    No results found for this or any previous visit (from the past 24 hour(s)).         Review of Systems:     Review of Systems   Constitutional: Negative.             Physical Exam:     Vitals:    22 0902 22 0904   BP: (!) 141/98 (!) 143/85   Pulse: 65    Resp: 18    Temp: 98.2  F (36.8  C)    TempSrc: Oral    SpO2: 98%    Weight: 74.4 kg (164 lb)    Height: 1.532 m (5' 0.32\")      Body mass index is 31.7 kg/m .    Physical Exam  Vitals and nursing note reviewed.   Constitutional:       General: He is not in acute distress.     Appearance: Normal appearance. He is not ill-appearing, toxic-appearing or diaphoretic.   Pulmonary:      " Effort: No respiratory distress.   Neurological:      Mental Status: He is alert and oriented to person, place, and time.

## 2022-03-16 NOTE — PATIENT INSTRUCTIONS
Increase the dose of allopurinol to 200mg (two 100mg, take both at the same time)    For the next month, take the colchicine (one pill) every morning. This will only go for the next month and then it will stop.    I sent over a refill for your lisinopril and your allergy eye drops.    It was great to see you,    Dr. Saenz

## 2022-03-16 NOTE — NURSING NOTE
Due to patient being non-English speaking/uses sign language, an  was used for this visit. Only for face-to-face interpretation by an external agency, date and length of interpretation can be found on the scanned worksheet.     name: Molly Fry  Agency: [employer]  Language: Cem   Telephone number: [phone]  Type of interpretation: Face-to-face, spoken

## 2022-03-17 ASSESSMENT — ENCOUNTER SYMPTOMS: CONSTITUTIONAL NEGATIVE: 1

## 2022-05-18 ENCOUNTER — IMMUNIZATION (OUTPATIENT)
Dept: FAMILY MEDICINE | Facility: CLINIC | Age: 72
End: 2022-05-18
Payer: MEDICARE

## 2022-05-18 PROCEDURE — 91306 COVID-19,PF,MODERNA (18+ YRS BOOSTER .25ML): CPT

## 2022-05-18 PROCEDURE — 0064A COVID-19,PF,MODERNA (18+ YRS BOOSTER .25ML): CPT

## 2022-05-18 PROCEDURE — 99207 PR NO CHARGE LOS: CPT

## 2022-09-06 DIAGNOSIS — I67.9 CEREBROVASCULAR DISEASE: ICD-10-CM

## 2022-09-06 DIAGNOSIS — E78.5 HYPERLIPIDEMIA, UNSPECIFIED HYPERLIPIDEMIA TYPE: ICD-10-CM

## 2022-09-06 DIAGNOSIS — I10 BENIGN ESSENTIAL HYPERTENSION: ICD-10-CM

## 2022-09-07 ENCOUNTER — TELEPHONE (OUTPATIENT)
Dept: FAMILY MEDICINE | Facility: CLINIC | Age: 72
End: 2022-09-07

## 2022-09-19 DIAGNOSIS — I10 BENIGN ESSENTIAL HYPERTENSION: ICD-10-CM

## 2022-09-19 NOTE — TELEPHONE ENCOUNTER
Message to physician:     Date of last visit: 3/16/2022    Date of next visit if scheduled:     Potassium   Date Value Ref Range Status   06/15/2021 4.2 3.5 - 5.0 mmol/L Final     Creatinine   Date Value Ref Range Status   06/15/2021 1.29 0.70 - 1.30 mg/dL Final     GFR Estimate   Date Value Ref Range Status   06/15/2021 55 (L) >60 mL/min/1.73m2 Final       BP Readings from Last 3 Encounters:   03/16/22 (!) 143/85   09/28/21 121/77   09/23/21 (!) 141/82       No results found for: A1C    Please complete refill and CLOSE ENCOUNTER.  Closing the encounter signifies the refill is complete.

## 2022-09-22 RX ORDER — LISINOPRIL 10 MG/1
10 TABLET ORAL DAILY
Qty: 30 TABLET | Refills: 0 | Status: SHIPPED | OUTPATIENT
Start: 2022-09-22 | End: 2022-11-08

## 2022-09-22 NOTE — TELEPHONE ENCOUNTER
Patient due for follow up. Small refill provided. Please schedule.    George Saenz III, MD, FAAFP  [facility]  09/22/22 7:58 AM

## 2022-10-10 RX ORDER — ATORVASTATIN CALCIUM 20 MG/1
TABLET, FILM COATED ORAL
Qty: 90 TABLET | OUTPATIENT
Start: 2022-10-10

## 2022-10-10 RX ORDER — LISINOPRIL 10 MG/1
TABLET ORAL
Qty: 90 TABLET | OUTPATIENT
Start: 2022-10-10

## 2022-10-10 NOTE — TELEPHONE ENCOUNTER
Patient due for follow up. Small refill provided in previous telephone encounters. Please schedule and verify that he got his meds..    George Saenz III, MD, FAAFP  [facility]idency Faculty  10/10/22 1:37 PM

## 2022-10-11 DIAGNOSIS — R05.9 COUGH: ICD-10-CM

## 2022-10-11 NOTE — TELEPHONE ENCOUNTER
Called pt to come in for medication check.    Pt would called daughter and come us back for the appt to see if daughter can take him (need ride)      [address]

## 2022-10-12 RX ORDER — ALBUTEROL SULFATE 90 UG/1
AEROSOL, METERED RESPIRATORY (INHALATION)
Qty: 18 G | Refills: 3 | Status: SHIPPED | OUTPATIENT
Start: 2022-10-12 | End: 2023-03-22

## 2022-11-08 ENCOUNTER — OFFICE VISIT (OUTPATIENT)
Dept: FAMILY MEDICINE | Facility: CLINIC | Age: 72
End: 2022-11-08
Payer: MEDICARE

## 2022-11-08 VITALS
BODY MASS INDEX: 32.29 KG/M2 | DIASTOLIC BLOOD PRESSURE: 81 MMHG | WEIGHT: 167.08 LBS | HEART RATE: 63 BPM | OXYGEN SATURATION: 97 % | SYSTOLIC BLOOD PRESSURE: 119 MMHG | RESPIRATION RATE: 18 BRPM

## 2022-11-08 DIAGNOSIS — I67.9 CEREBROVASCULAR DISEASE: ICD-10-CM

## 2022-11-08 DIAGNOSIS — I10 BENIGN ESSENTIAL HYPERTENSION: Primary | ICD-10-CM

## 2022-11-08 DIAGNOSIS — M1A.00X0 CHRONIC GOUTY ARTHROPATHY: ICD-10-CM

## 2022-11-08 DIAGNOSIS — E78.5 HYPERLIPIDEMIA, UNSPECIFIED HYPERLIPIDEMIA TYPE: ICD-10-CM

## 2022-11-08 LAB
ANION GAP SERPL CALCULATED.3IONS-SCNC: 18 MMOL/L (ref 7–15)
BUN SERPL-MCNC: 17.1 MG/DL (ref 8–23)
CALCIUM SERPL-MCNC: 9.7 MG/DL (ref 8.8–10.2)
CHLORIDE SERPL-SCNC: 102 MMOL/L (ref 98–107)
CHOLEST SERPL-MCNC: 187 MG/DL
CREAT SERPL-MCNC: 1.22 MG/DL (ref 0.67–1.17)
DEPRECATED HCO3 PLAS-SCNC: 19 MMOL/L (ref 22–29)
GFR SERPL CREATININE-BSD FRML MDRD: 64 ML/MIN/1.73M2
GLUCOSE SERPL-MCNC: 92 MG/DL (ref 70–99)
HDLC SERPL-MCNC: 38 MG/DL
LDLC SERPL CALC-MCNC: 108 MG/DL
NONHDLC SERPL-MCNC: 149 MG/DL
POTASSIUM SERPL-SCNC: 4.2 MMOL/L (ref 3.4–5.3)
SODIUM SERPL-SCNC: 139 MMOL/L (ref 136–145)
TRIGL SERPL-MCNC: 204 MG/DL

## 2022-11-08 PROCEDURE — 84550 ASSAY OF BLOOD/URIC ACID: CPT | Performed by: FAMILY MEDICINE

## 2022-11-08 PROCEDURE — 80061 LIPID PANEL: CPT | Performed by: FAMILY MEDICINE

## 2022-11-08 PROCEDURE — 99214 OFFICE O/P EST MOD 30 MIN: CPT | Mod: 25 | Performed by: FAMILY MEDICINE

## 2022-11-08 PROCEDURE — 80048 BASIC METABOLIC PNL TOTAL CA: CPT | Performed by: FAMILY MEDICINE

## 2022-11-08 PROCEDURE — 0134A COVID-19,PF,MODERNA BIVALENT: CPT | Performed by: FAMILY MEDICINE

## 2022-11-08 PROCEDURE — 36415 COLL VENOUS BLD VENIPUNCTURE: CPT | Performed by: FAMILY MEDICINE

## 2022-11-08 PROCEDURE — 91313 COVID-19,PF,MODERNA BIVALENT: CPT | Performed by: FAMILY MEDICINE

## 2022-11-08 RX ORDER — ALLOPURINOL 100 MG/1
200 TABLET ORAL DAILY
Qty: 180 TABLET | Refills: 4 | Status: SHIPPED | OUTPATIENT
Start: 2022-11-08 | End: 2024-04-08

## 2022-11-08 RX ORDER — LISINOPRIL 10 MG/1
10 TABLET ORAL DAILY
Qty: 90 TABLET | Refills: 4 | Status: SHIPPED | OUTPATIENT
Start: 2022-11-08 | End: 2023-12-07

## 2022-11-08 RX ORDER — ASPIRIN 81 MG/1
81 TABLET, CHEWABLE ORAL DAILY
Qty: 90 TABLET | Refills: 4 | Status: SHIPPED | OUTPATIENT
Start: 2022-11-08 | End: 2024-04-23

## 2022-11-08 RX ORDER — ATORVASTATIN CALCIUM 20 MG/1
20 TABLET, FILM COATED ORAL DAILY
Qty: 90 TABLET | Refills: 4 | Status: SHIPPED | OUTPATIENT
Start: 2022-11-08 | End: 2022-11-14 | Stop reason: DRUGHIGH

## 2022-11-08 NOTE — PROGRESS NOTES
"Patient presents with:  Refill Request: Lisinopril, Atorvastatin,Allopurinol,   Imm/Inj: Moderna bivalent      Assessment & Plan   Problem List Items Addressed This Visit        Active Problems    Chronic Gout    Relevant Medications    allopurinol (ZYLOPRIM) 100 MG tablet    aspirin (ASA) 81 MG chewable tablet    Other Relevant Orders    Uric acid    Hyperlipidemia    Relevant Medications    atorvastatin (LIPITOR) 20 MG tablet    Other Relevant Orders    Lipid Profile    Cerebrovascular disease    Relevant Medications    aspirin (ASA) 81 MG chewable tablet    atorvastatin (LIPITOR) 20 MG tablet    Benign essential hypertension - Primary    Relevant Medications    lisinopril (ZESTRIL) 10 MG tablet    Other Relevant Orders    Basic metabolic panel          17 minutes spent on the date of the encounter doing chart review, history and exam, documentation and further activities per the note       BMI:   Estimated body mass index is 32.29 kg/m  as calculated from the following:    Height as of 3/16/22: 1.532 m (5' 0.32\").    Weight as of this encounter: 75.8 kg (167 lb 1.3 oz).   Weight management plan: Discussed healthy diet and exercise guidelines    MEDICATIONS:   Orders Placed This Encounter   Medications     allopurinol (ZYLOPRIM) 100 MG tablet     Sig: Take 2 tablets (200 mg) by mouth daily     Dispense:  180 tablet     Refill:  4     aspirin (ASA) 81 MG chewable tablet     Sig: Take 1 tablet (81 mg) by mouth daily     Dispense:  90 tablet     Refill:  4     lisinopril (ZESTRIL) 10 MG tablet     Sig: Take 1 tablet (10 mg) by mouth daily     Dispense:  90 tablet     Refill:  4     atorvastatin (LIPITOR) 20 MG tablet     Sig: Take 1 tablet (20 mg) by mouth daily     Dispense:  90 tablet     Refill:  4     Medications Discontinued During This Encounter   Medication Reason     colchicine (COLCYRS) 0.6 MG tablet Medication Reconciliation Clean Up     fexofenadine (ALLEGRA) 180 MG tablet Medication Reconciliation Clean " Up     omeprazole (PRILOSEC) 40 MG DR capsule Medication Reconciliation Clean Up     loratadine (CLARITIN) 10 MG tablet Medication Reconciliation Clean Up     lidocaine, Anorectal, 5 % CREA Medication Reconciliation Clean Up     aspirin (ASA) 81 MG chewable tablet Reorder     allopurinol (ZYLOPRIM) 100 MG tablet Reorder     atorvastatin (LIPITOR) 20 MG tablet Reorder     lisinopril (ZESTRIL) 10 MG tablet Reorder          - Continue other medications without change  Work on weight loss  Regular exercise  Patient Instructions   Meds refilled  Will get lab results to you      Return in about 6 months (around 5/8/2023) for BP Recheck, Gout.    Ja Mcdaniel MD  [facility]    Subjective   Cher is a 72 year old accompanied by his spouse and , presenting for the following health issues:  Refill Request (Lisinopril, Atorvastatin,Allopurinol, ) and Imm/Inj (Moderna bivalent)      HPI       SUBJECTIVE:  A 72-year-old male in for refill of medications for hypertension, gout, hyperlipidemia and cerebrovascular disease.  He does not have any symptoms and he has had no recent gout attacks.  He is having no problems with his medications.      OBJECTIVE:  His lungs are clear.  No edema.  He appears to be healthy except for his weight.    ASSESSMENT AND PLAN: I refilled his medications.  We will check lab results and get results to him when they are available.  Recheck in 6 months or earlier if problems.  The patient and wife involved in medical decision making throughout the visit.  An external [employer]  used for entire visit.      Review of Systems   Constitutional, HEENT, cardiovascular, pulmonary, gi and gu systems are negative, except as otherwise noted.      Objective    /81   Pulse 63   Resp 18   Wt 75.8 kg (167 lb 1.3 oz)   SpO2 97%   BMI 32.29 kg/m    Body mass index is 32.29 kg/m .  Physical Exam     No results found for this or any previous visit (from the  past 24 hour(s)).

## 2022-11-09 LAB — URATE SERPL-MCNC: 6.1 MG/DL (ref 3.4–7)

## 2022-11-14 DIAGNOSIS — E78.5 HYPERLIPIDEMIA, UNSPECIFIED HYPERLIPIDEMIA TYPE: Primary | ICD-10-CM

## 2022-11-14 RX ORDER — ATORVASTATIN CALCIUM 40 MG/1
40 TABLET, FILM COATED ORAL DAILY
Qty: 90 TABLET | Refills: 4 | Status: SHIPPED | OUTPATIENT
Start: 2022-11-14 | End: 2024-01-16

## 2023-03-22 ENCOUNTER — OFFICE VISIT (OUTPATIENT)
Dept: FAMILY MEDICINE | Facility: CLINIC | Age: 73
End: 2023-03-22
Payer: MEDICARE

## 2023-03-22 ENCOUNTER — ANCILLARY PROCEDURE (OUTPATIENT)
Dept: GENERAL RADIOLOGY | Facility: CLINIC | Age: 73
End: 2023-03-22
Attending: FAMILY MEDICINE
Payer: MEDICARE

## 2023-03-22 VITALS
WEIGHT: 165.8 LBS | BODY MASS INDEX: 32.04 KG/M2 | SYSTOLIC BLOOD PRESSURE: 118 MMHG | HEART RATE: 61 BPM | DIASTOLIC BLOOD PRESSURE: 76 MMHG | OXYGEN SATURATION: 97 % | RESPIRATION RATE: 18 BRPM

## 2023-03-22 DIAGNOSIS — R05.3 CHRONIC COUGH: Primary | ICD-10-CM

## 2023-03-22 DIAGNOSIS — R05.3 CHRONIC COUGH: ICD-10-CM

## 2023-03-22 DIAGNOSIS — R06.83 SNORING: ICD-10-CM

## 2023-03-22 DIAGNOSIS — J30.1 SEASONAL ALLERGIC RHINITIS DUE TO POLLEN: ICD-10-CM

## 2023-03-22 PROCEDURE — 71046 X-RAY EXAM CHEST 2 VIEWS: CPT | Mod: TC | Performed by: RADIOLOGY

## 2023-03-22 PROCEDURE — 99215 OFFICE O/P EST HI 40 MIN: CPT | Performed by: FAMILY MEDICINE

## 2023-03-22 RX ORDER — ALBUTEROL SULFATE 90 UG/1
AEROSOL, METERED RESPIRATORY (INHALATION)
Qty: 18 G | Refills: 3 | Status: SHIPPED | OUTPATIENT
Start: 2023-03-22 | End: 2024-04-23

## 2023-03-22 RX ORDER — AZELASTINE HYDROCHLORIDE 0.5 MG/ML
1 SOLUTION/ DROPS OPHTHALMIC 2 TIMES DAILY PRN
Qty: 6 ML | Refills: 11 | Status: SHIPPED | OUTPATIENT
Start: 2023-03-22 | End: 2023-06-16

## 2023-03-22 RX ORDER — OMEPRAZOLE 40 MG/1
40 CAPSULE, DELAYED RELEASE ORAL DAILY
Qty: 90 CAPSULE | Refills: 0 | Status: SHIPPED | OUTPATIENT
Start: 2023-03-22 | End: 2023-06-16

## 2023-03-22 RX ORDER — FLUTICASONE PROPIONATE 50 MCG
2 SPRAY, SUSPENSION (ML) NASAL DAILY
Qty: 48 G | Refills: 3 | Status: SHIPPED | OUTPATIENT
Start: 2023-03-22 | End: 2024-09-03

## 2023-03-22 ASSESSMENT — ENCOUNTER SYMPTOMS: CONSTITUTIONAL NEGATIVE: 1
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
## 2023-05-30 ENCOUNTER — HOSPITAL ENCOUNTER (OUTPATIENT)
Dept: CT IMAGING | Facility: HOSPITAL | Age: 73
Discharge: HOME | End: 2023-05-30
Attending: STUDENT IN AN ORGANIZED HEALTH CARE EDUCATION/TRAINING PROGRAM | Admitting: STUDENT IN AN ORGANIZED HEALTH CARE EDUCATION/TRAINING PROGRAM
Payer: MEDICARE

## 2023-05-30 ENCOUNTER — OFFICE VISIT (OUTPATIENT)
Dept: FAMILY MEDICINE | Facility: CLINIC | Age: 73
End: 2023-05-30
Payer: MEDICARE

## 2023-05-30 VITALS
DIASTOLIC BLOOD PRESSURE: 78 MMHG | SYSTOLIC BLOOD PRESSURE: 118 MMHG | HEIGHT: 60 IN | TEMPERATURE: 98.4 F | RESPIRATION RATE: 18 BRPM | HEART RATE: 64 BPM | OXYGEN SATURATION: 95 % | BODY MASS INDEX: 32.79 KG/M2 | WEIGHT: 167 LBS

## 2023-05-30 DIAGNOSIS — Z11.59 NEED FOR HEPATITIS C SCREENING TEST: ICD-10-CM

## 2023-05-30 DIAGNOSIS — Z23 NEED FOR SHINGLES VACCINE: ICD-10-CM

## 2023-05-30 DIAGNOSIS — R35.0 URINARY FREQUENCY: Primary | ICD-10-CM

## 2023-05-30 DIAGNOSIS — R22.1 NECK SWELLING: ICD-10-CM

## 2023-05-30 LAB
ALBUMIN UR-MCNC: NEGATIVE MG/DL
APPEARANCE UR: CLEAR
BILIRUB UR QL STRIP: NEGATIVE
COLOR UR AUTO: YELLOW
GLUCOSE UR STRIP-MCNC: NEGATIVE MG/DL
HGB UR QL STRIP: NEGATIVE
KETONES UR STRIP-MCNC: NEGATIVE MG/DL
LEUKOCYTE ESTERASE UR QL STRIP: NEGATIVE
NITRATE UR QL: NEGATIVE
PH UR STRIP: 5.5 [PH] (ref 5–7)
SP GR UR STRIP: 1.01 (ref 1–1.03)
UROBILINOGEN UR STRIP-ACNC: 0.2 E.U./DL

## 2023-05-30 PROCEDURE — 250N000011 HC RX IP 250 OP 636: Performed by: STUDENT IN AN ORGANIZED HEALTH CARE EDUCATION/TRAINING PROGRAM

## 2023-05-30 PROCEDURE — 81003 URINALYSIS AUTO W/O SCOPE: CPT | Performed by: STUDENT IN AN ORGANIZED HEALTH CARE EDUCATION/TRAINING PROGRAM

## 2023-05-30 PROCEDURE — G1010 CDSM STANSON: HCPCS

## 2023-05-30 PROCEDURE — 99215 OFFICE O/P EST HI 40 MIN: CPT | Performed by: STUDENT IN AN ORGANIZED HEALTH CARE EDUCATION/TRAINING PROGRAM

## 2023-05-30 PROCEDURE — 82565 ASSAY OF CREATININE: CPT

## 2023-05-30 RX ORDER — IOPAMIDOL 755 MG/ML
90 INJECTION, SOLUTION INTRAVASCULAR ONCE
Status: DISCONTINUED | OUTPATIENT
Start: 2023-05-30 | End: 2023-05-30

## 2023-05-30 RX ORDER — IOPAMIDOL 755 MG/ML
67 INJECTION, SOLUTION INTRAVASCULAR ONCE
Status: COMPLETED | OUTPATIENT
Start: 2023-05-30 | End: 2023-05-30

## 2023-05-30 RX ADMIN — IOPAMIDOL 67 ML: 755 INJECTION, SOLUTION INTRAVENOUS at 13:36

## 2023-05-30 NOTE — RESULT ENCOUNTER NOTE
Could you call the patient with the following message? Thank you!    Wakou,    Your urine test was normal which shows that you do not have an infection. If you continue to have any symptoms please return to clinic.     Sincerely,  Dr. Kendra Awad

## 2023-05-30 NOTE — PROGRESS NOTES
Assessment & Plan       Urinary frequency  UA normal today. RTC if symptoms do not resolve.  - UA Macroscopic with reflex to Microscopic and Culture; Future  - UA Macroscopic with reflex to Microscopic and Culture    Neck swelling  I am concerned for infection of the soft tissue or bone especially given his recent metal dental implant. Recommended CT. I called radiology at [place] to confirm that CT neck with contrast will capture the area we are interested in and the staff radiologist confirmed that it would.   - CT SOFT TISSUE NECK W CONTRAST; Future      I spent a total of 40 minutes on the day of the visit.   Time spent by me doing chart review, history and exam, documentation and further activities per the note       BMI:   Estimated body mass index is 32.61 kg/m  as calculated from the following:    Height as of this encounter: 1.524 m (5').    Weight as of this encounter: 75.8 kg (167 lb).           No follow-ups on file.    Kendra Awad MD  [facility] [place] Subjective Wakou is a 73 year old, presenting for the following health issues:  Follow Up (SOB) and Jaw Pain (Pain inside back of the Jaw 2 days ago)        5/30/2023    10:28 AM   Additional Questions   Roomed by hiwot gaviria   Accompanied by self     HPI     Last visit saw Dr. Saenz for shortness of breath.   Had a chest xray which was read as possible atelectasis vs consolidation.  Since then shortness of breath has completely resolved. No wheezing when he sleeps like he had before.   He did start the omeprazole and he feels it is helping.   He missed his appointment for the PFTs, doesn't plan to reschedule since symptoms resolved.     Has new pain and swelling of the right throat/jaw.   Started 2 days ago  Presses the area and it feels tight  Has trouble swallowing- hurts even to swallow saliva. No dysphagia however.  Right posterior neck hurts too and feels tight.   Things are not getting stuck.   No changes to his  voice  Tylenol helped a little bit.   No cold or fevers or chills. Otherwise feels normal. No chest pain, no nausea/vomiting. Reflux symptoms have improved recently.     2 weeks ago metal implant for dental work. Has another dental appointment coming up. The area where the implant is does not hurt.    Also reports urinary frequency which is new to him, not painful.             Review of Systems   Constitutional, HEENT, cardiovascular, pulmonary, gi and gu systems are negative, except as otherwise noted.      Objective    /78   Pulse 64   Temp 98.4  F (36.9  C) (Oral)   Resp 18   Ht 1.524 m (5')   Wt 75.8 kg (167 lb)   SpO2 95%   BMI 32.61 kg/m    Body mass index is 32.61 kg/m .  Physical Exam   GENERAL: healthy, alert and no distress  HEENT: Normal oropharynx. Uvula midline. No tonsillar enlargement. There is a metal implant visible in right lower molar. Normal external auditory canals and TMs BL.  NECK: There is visible and palpable swelling of the right neck/jaw around the right angle of the mandible and posterior to the angle of the mandible. Area is tender to the touch but not red or hot.   RESP: lungs clear to auscultation - no rales, rhonchi or wheezes  CV: regular rate and rhythm, normal S1 S2, no S3 or S4, no murmur, click or rub, no peripheral edema and peripheral pulses strong  MS: no gross musculoskeletal defects noted, no edema

## 2023-05-31 LAB
CREAT BLD-MCNC: 1.4 MG/DL (ref 0.7–1.3)
GFR SERPL CREATININE-BSD FRML MDRD: 54 ML/MIN/1.73M2

## 2023-05-31 NOTE — RESULT ENCOUNTER NOTE
Could you call the patient with the following message? Thank you!    Dear Cher,    Your CT showed no sign of infection near your dental implant or at the area that felt swollen to us yesterday. This is good news.     There was one incidental finding on your scan which was a 1.6cm thyroid nodule on the left. The radiologist recommended we further evaluate this with an ultrasound. If you are ok with that, let me know and I will order the ultrasound for you. If anything changes with your swelling, please return to clinic.    Sincerely,  Dr. Kendra Awad

## 2023-06-01 ENCOUNTER — APPOINTMENT (OUTPATIENT)
Dept: INTERPRETER SERVICES | Facility: CLINIC | Age: 73
End: 2023-06-01
Payer: MEDICARE

## 2023-06-16 ENCOUNTER — OFFICE VISIT (OUTPATIENT)
Dept: FAMILY MEDICINE | Facility: CLINIC | Age: 73
End: 2023-06-16
Payer: MEDICARE

## 2023-06-16 VITALS
RESPIRATION RATE: 20 BRPM | HEIGHT: 62 IN | HEART RATE: 63 BPM | DIASTOLIC BLOOD PRESSURE: 80 MMHG | WEIGHT: 168 LBS | BODY MASS INDEX: 30.91 KG/M2 | OXYGEN SATURATION: 98 % | TEMPERATURE: 97.7 F | SYSTOLIC BLOOD PRESSURE: 137 MMHG

## 2023-06-16 DIAGNOSIS — J30.1 SEASONAL ALLERGIC RHINITIS DUE TO POLLEN: ICD-10-CM

## 2023-06-16 DIAGNOSIS — K21.00 GASTROESOPHAGEAL REFLUX DISEASE WITH ESOPHAGITIS WITHOUT HEMORRHAGE: ICD-10-CM

## 2023-06-16 DIAGNOSIS — M79.661 PAIN OF RIGHT LOWER LEG: Primary | ICD-10-CM

## 2023-06-16 DIAGNOSIS — S76.911A MUSCLE STRAIN OF RIGHT THIGH, INITIAL ENCOUNTER: ICD-10-CM

## 2023-06-16 PROCEDURE — 99214 OFFICE O/P EST MOD 30 MIN: CPT | Mod: GC

## 2023-06-16 RX ORDER — OMEPRAZOLE 40 MG/1
40 CAPSULE, DELAYED RELEASE ORAL DAILY
Qty: 90 CAPSULE | Refills: 0 | Status: CANCELLED | OUTPATIENT
Start: 2023-06-16

## 2023-06-16 RX ORDER — NAPROXEN 500 MG/1
500 TABLET ORAL 2 TIMES DAILY WITH MEALS
Qty: 30 TABLET | Refills: 0 | Status: SHIPPED | OUTPATIENT
Start: 2023-06-16 | End: 2024-04-23

## 2023-06-16 RX ORDER — LISINOPRIL 10 MG/1
10 TABLET ORAL DAILY
Qty: 90 TABLET | Refills: 4 | Status: CANCELLED | OUTPATIENT
Start: 2023-06-16

## 2023-06-16 RX ORDER — OMEPRAZOLE 40 MG/1
40 CAPSULE, DELAYED RELEASE ORAL DAILY
Qty: 90 CAPSULE | Refills: 0 | Status: SHIPPED | OUTPATIENT
Start: 2023-06-16 | End: 2024-04-23

## 2023-06-16 RX ORDER — AZELASTINE HYDROCHLORIDE 0.5 MG/ML
1 SOLUTION/ DROPS OPHTHALMIC 2 TIMES DAILY PRN
Qty: 6 ML | Refills: 11 | Status: CANCELLED | OUTPATIENT
Start: 2023-06-16

## 2023-06-16 RX ORDER — AZELASTINE HYDROCHLORIDE 0.5 MG/ML
1 SOLUTION/ DROPS OPHTHALMIC 2 TIMES DAILY PRN
Qty: 6 ML | Refills: 11 | Status: SHIPPED | OUTPATIENT
Start: 2023-06-16 | End: 2024-05-07

## 2023-06-16 NOTE — PROGRESS NOTES
"  Assessment & Plan     (M79.661) Pain of right lower leg  (primary encounter diagnosis)  (S76.910Z) Muscle strain of right thigh, initial encounter  Comment: Likely leg strain. Given resolution of pain using OTC NSAIDs, will continue with naproxen at this time for pain relief. Recommend taking NSAIDs with food and continuing omepreazole to help protect GI. If pain persists, would consider PT. Patient in agreement with plan.  Plan: naproxen (NAPROSYN) 500 MG tablet          (K21.00) Gastroesophageal reflux disease with esophagitis without hemorrhage  Comment: Refilled. Discussed that use of NSAIDs will likely worsen symptoms, recommended adhering to PPI to help protect GI lining.  Plan: omeprazole (PRILOSEC) 40 MG DR capsule          (J30.1) Seasonal allergic rhinitis due to pollen  Comment: Refilled.  Plan: azelastine (OPTIVAR) 0.05 % ophthalmic solution            BMI:   Estimated body mass index is 30.73 kg/m  as calculated from the following:    Height as of this encounter: 1.575 m (5' 2\").    Weight as of this encounter: 76.2 kg (168 lb).   Weight management plan: Patient was referred to their PCP to discuss a diet and exercise plan.        No follow-ups on file.    Tanya Roy MD  [facility] [place]  Precepted patient with Dr. George Saenz.      Bert Kwon is a 73 year old, presenting for the following health issues:  RECHECK (Right leg pain, 3 days, pain 7-8/10, more pain when walking, feels like nerve pain, starts at grain and works way down leg)        5/30/2023    10:28 AM   Additional Questions   Roomed by hiwot gaviria   Accompanied by self     HPI     Patient with PMHx of gout, HTN, HLD, and a previous CVA presenting for right leg pain    Right leg pain from the hip to the thigh and then down to the inner leg  Was bending back and forth trying to carry wood at [employer] and then doing yard work  Onset 3 days ago  Worsened by standing and twisting the leg, better with rest  Feels " "like nerve pain, no burning, but feels more like he's limping  No pain today but at its worst was 8 out of 10  No burning or itching  No redness or rash  Relieved with rest, a medicaton called Joesph (bought at the [employer] market, listed as \"piroxicam\" per photo)  Also took Tylenol but did not help  No fever, no chills, no absence of pulses, worse with weight bearing  No problems with urination, no back pain, no abdominal pain no vomiting    Home meds include statin for HLD and CVA ppx  Also on allopurinol and colchicine for gout, last uric acid 6.1 on 11/8/22, will need recheck at next visit since symptoms could be consistent with flare today  Would like refill on eye drops as well today for allergies      Review of Systems   Constitutional, HEENT, cardiovascular, pulmonary, gi and gu systems are negative, except as otherwise noted.      Objective    /80   Pulse 63   Temp 97.7  F (36.5  C) (Oral)   Resp 20   Ht 1.575 m (5' 2\")   Wt 76.2 kg (168 lb)   SpO2 98%   BMI 30.73 kg/m    Body mass index is 30.73 kg/m .     Physical Exam   General: Alert and oriented x 3, no acute distress  Skin: clean, dry, and intact  HEENT: normocephalic, atraumatic, PERRL, EOMI, no conjunctival injection or icterus, ears and nose normal, no LAD or masses  Resp: clear to ausculation bilaterally, no rales or wheezes  Cardio: RRR, S1 and S2 present, no S3 or S4, no rubs or murmurs  Abdomen: soft, nontender, nondistended  Extremities: no erythema or edema. Muscle strength 5/5 with pain of lower right leg, negative straight leg raise, full ROM. Pain localized to thigh and groin region. Pulses 2+, left leg exam unremarkable.      ----- Service Performed and Documented by Resident or Fellow ------            "

## 2023-07-19 NOTE — PROGRESS NOTES
Preceptor Attestation:   Patient seen, evaluated and discussed with the resident. I have verified the content of the note, which accurately reflects my assessment of the patient and the plan of care.    Supervising Physician:George Saenz MD    [facility]

## 2023-07-30 NOTE — PROGRESS NOTES
Medicare Wellness Visit  Health Risk Assessment           Health Risk Assessment / Review of Systems     Constitutional: Any fevers or night sweats? No     Eyes:  Vision problems   No     Hearing Do you feel you have hearing loss?   YES - bilateral decrease hearing. Beginning of September was fitted and prescribed bilateral hearing aids. Has been using hearing aid devices with great improvement to hearing.    Cardiovascular: Any chest pain, fast or irregular heart beat, calf pain with walking?     No           Respiratory:   Any breathing problems or cough?   No- had seasonal allergies, with somewhat productive cough, intermittently x 1 month. Has improved to resolved.    Gastrointestinal: Any stomach or stool problems?   No      Genitourinary: Do you have difficulty controlling urination?   No     Muscles and Joints: Any joint stiffness or soreness?   No     Skin: Any concerning lesions or moles?   No     Nervous System: Any loss of strength or feeling, numbness or tingling, shaking, dizziness, or headache?   YES - reports for the last year, left hand, 5th digit numbness and inability to make fist. Also noticed decreased strength in finger. No injury or trauma. Relieved with massaging. 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     Mental Health: Any depression, anxiety or problems sleeping?    No     Cognition: Do you have any problems with your memory?   YES - reports having short term memory decrease but feels its appropriate for age.           Medical Care     What other specialists or organizations are involved in your medical care?  NONE  Patient Care Team       Relationship Specialty Notifications Start End    George Saenz MD PCP - General Family Medicine  6/15/21     Phone: [phone] Fax: [phone] [address] [place] [address]    George Saenz MD Assigned PCP   6/16/21     Phone: [phone] Fax: [phone] [address] [place] [address]          Have  you been to the ER or overnight in the hospital in the last year?  No          Social History / Home Safety       Marital Status:  Who lives in your household? Currently Cher and his wife are living with his younger brother and brother's family.    Do you feel threatened or controlled by a partner, ex-partner or anyone in your life? No     Has anyone hurt you physically, for example by pushing, hitting, slapping or kicking you   or forcing you to have sex? No          Does your home have any of the following safety concerns; loose rugs in the hallway,  bathrooms with no grab bars by the tub or toilet, stairs with no handrails or poorly lit areas?   YES - no grab bars in the bathroom, Cher uses shower chair which he received in 2015 secondary to CVA.    Do you need help with dressing yourself, bathing, eating or getting around your home?  No     Do you need help with the phone, transportation, shopping, preparing meals, housework, laundry, medications or managing money?No       Risk Behaviors and Healthy Habits     History   Smoking Status     Never Smoker   Smokeless Tobacco     Never Used     How many servings of fruits and vegetables do you eat a day? 3 servings a day    Exercise: walking one time a day, 2-3 days a week x 1 mile each time walk.     Do you frequently drive without a seatbelt? No     Do you use tobacco?  No     Do you use any other drugs? No         Do you use alcohol?No      Frailty Assessment            Have you lost 10 or more pounds unintentionally in the previous year? No     How difficult is walking from one room to the other on the same level? not       Is it difficult to lift or carry something as heavy as 10 pounds?mildly      Compared with most (men/women) your age, would you say  that you are more active, less active, or about the same?  same        FALL RISK ASSESSMENT 9/28/2021   Fallen 2 or more times in the past year? No   Any fall with injury in the past year? No   Timed Up  and Go Test/Seconds (13.5 is a fall risk; contact physician) 13         Advance Directives:   Discussed with patient and family as appropriate. Has patient  completed advance directives and/or a living will? No- blank copy given to patient to take home, review with his children who are in the medical field/providers and will complete it at their convenience.    Alicja Lacey RN   Psych/Behavioral

## 2023-10-03 ENCOUNTER — TELEPHONE (OUTPATIENT)
Dept: FAMILY MEDICINE | Facility: CLINIC | Age: 73
End: 2023-10-03
Payer: MEDICARE

## 2023-10-03 NOTE — LETTER
10/3/2023         RE: Cher Rae  [address]  [place] [address]      To Whom it May Concern:    Cher Rae is under my care at [facility]. His mobility and quality of life would be enhanced by having a first floor apartment.          George Saenz III, MD, FAAFP  [facility] Residency Faculty  10/13/23 2:27 PM

## 2023-10-03 NOTE — TELEPHONE ENCOUNTER
[facility] phone call message- general phone call:    Reason for call: patient's daughter called stating that the patient has an opportunity to mive to a first floor which they feel will be better for mobility- a doctor's note is needed to facilitate this.  She is requesting such a note.  Please advise if able to do so.  Daughter phone [phone] or cn give to Call center to place up front for      Return call needed: Yes    OK to leave a message on voice mail? Yes    Primary language: Hmong      needed? Yes    Call taken on October 3, 2023 at 1:15 PM by Mireya Chapa .

## 2023-10-10 NOTE — TELEPHONE ENCOUNTER
is requesting a medical necessity note to bump patient up on priority list to move to the first level. There is no form provided by housing. Currently there is no resident living on first level. Macy RN

## 2023-10-10 NOTE — TELEPHONE ENCOUNTER
Should be able to accomplish this. Usually, the apartment complex has a form for us to complete.    Dr. Saenz

## 2023-10-13 NOTE — TELEPHONE ENCOUNTER
I called patients daughter to  letter --or fax.  LFMTCB 10/13 lucas  Letter in  file under Cher Rae

## 2023-12-07 DIAGNOSIS — I10 BENIGN ESSENTIAL HYPERTENSION: ICD-10-CM

## 2023-12-07 RX ORDER — LISINOPRIL 10 MG/1
10 TABLET ORAL DAILY
Qty: 30 TABLET | Refills: 0 | Status: SHIPPED | OUTPATIENT
Start: 2023-12-07 | End: 2024-04-08

## 2023-12-07 NOTE — TELEPHONE ENCOUNTER
"Message to physician: Rx refill    Date of last visit: 6/16/2023    Date of next visit if scheduled: none    Potassium   Date Value Ref Range Status   11/08/2022 4.2 3.4 - 5.3 mmol/L Final   06/15/2021 4.2 3.5 - 5.0 mmol/L Final     Creatinine   Date Value Ref Range Status   11/08/2022 1.22 (H) 0.67 - 1.17 mg/dL Final   06/15/2021 1.29 0.70 - 1.30 mg/dL Final     Creatinine POCT   Date Value Ref Range Status   05/30/2023 1.4 (H) 0.7 - 1.3 mg/dL Final     GFR Estimate   Date Value Ref Range Status   06/15/2021 55 (L) >60 mL/min/1.73m2 Final     GFR, ESTIMATED POCT   Date Value Ref Range Status   05/30/2023 54 (L) >60 mL/min/1.73m2 Final       BP Readings from Last 3 Encounters:   06/16/23 137/80   05/30/23 118/78   03/22/23 118/76       No results found for: \"A1C\"    Please complete refill and CLOSE ENCOUNTER.  Closing the encounter signifies the refill is complete.    "

## 2023-12-07 NOTE — TELEPHONE ENCOUNTER
Patient due for follow up. Small refill provided. Please schedule.    George Saenz III, MD, FAAFP  [facility]  12/07/23 4:28 PM

## 2024-01-16 DIAGNOSIS — E78.5 HYPERLIPIDEMIA, UNSPECIFIED HYPERLIPIDEMIA TYPE: ICD-10-CM

## 2024-01-18 RX ORDER — ATORVASTATIN CALCIUM 40 MG/1
40 TABLET, FILM COATED ORAL DAILY
Qty: 90 TABLET | Refills: 3 | Status: SHIPPED | OUTPATIENT
Start: 2024-01-18

## 2024-04-08 DIAGNOSIS — M1A.00X0 CHRONIC GOUTY ARTHROPATHY: ICD-10-CM

## 2024-04-08 DIAGNOSIS — I10 BENIGN ESSENTIAL HYPERTENSION: ICD-10-CM

## 2024-04-08 NOTE — TELEPHONE ENCOUNTER
"Message to physician: Pharm req refill thanks    Date of last visit: 6/16/2023    Date of next visit if scheduled: 04/23/24    Potassium   Date Value Ref Range Status   11/08/2022 4.2 3.4 - 5.3 mmol/L Final   06/15/2021 4.2 3.5 - 5.0 mmol/L Final     Creatinine   Date Value Ref Range Status   11/08/2022 1.22 (H) 0.67 - 1.17 mg/dL Final   06/15/2021 1.29 0.70 - 1.30 mg/dL Final     Creatinine POCT   Date Value Ref Range Status   05/30/2023 1.4 (H) 0.7 - 1.3 mg/dL Final     GFR Estimate   Date Value Ref Range Status   06/15/2021 55 (L) >60 mL/min/1.73m2 Final     GFR, ESTIMATED POCT   Date Value Ref Range Status   05/30/2023 54 (L) >60 mL/min/1.73m2 Final       BP Readings from Last 3 Encounters:   06/16/23 137/80   05/30/23 118/78   03/22/23 118/76       No results found for: \"A1C\"    Please complete refill and CLOSE ENCOUNTER.  Closing the encounter signifies the refill is complete.   "

## 2024-04-09 RX ORDER — ALLOPURINOL 100 MG/1
200 TABLET ORAL DAILY
Qty: 60 TABLET | Refills: 0 | Status: SHIPPED | OUTPATIENT
Start: 2024-04-09 | End: 2024-04-23

## 2024-04-09 RX ORDER — LISINOPRIL 10 MG/1
10 TABLET ORAL DAILY
Qty: 30 TABLET | Refills: 0 | Status: SHIPPED | OUTPATIENT
Start: 2024-04-09 | End: 2024-04-23

## 2024-04-23 ENCOUNTER — OFFICE VISIT (OUTPATIENT)
Dept: FAMILY MEDICINE | Facility: CLINIC | Age: 74
End: 2024-04-23
Payer: MEDICARE

## 2024-04-23 VITALS
WEIGHT: 164 LBS | BODY MASS INDEX: 30.96 KG/M2 | OXYGEN SATURATION: 96 % | TEMPERATURE: 97.9 F | HEART RATE: 73 BPM | SYSTOLIC BLOOD PRESSURE: 128 MMHG | DIASTOLIC BLOOD PRESSURE: 78 MMHG | HEIGHT: 61 IN

## 2024-04-23 DIAGNOSIS — Z11.59 NEED FOR HEPATITIS B SCREENING TEST: ICD-10-CM

## 2024-04-23 DIAGNOSIS — E78.5 HYPERLIPIDEMIA, UNSPECIFIED HYPERLIPIDEMIA TYPE: ICD-10-CM

## 2024-04-23 DIAGNOSIS — I10 BENIGN ESSENTIAL HYPERTENSION: ICD-10-CM

## 2024-04-23 DIAGNOSIS — Z11.59 NEED FOR HEPATITIS C SCREENING TEST: ICD-10-CM

## 2024-04-23 DIAGNOSIS — M1A.00X0 CHRONIC GOUTY ARTHROPATHY: ICD-10-CM

## 2024-04-23 DIAGNOSIS — M79.642 PAIN OF LEFT HAND: Primary | ICD-10-CM

## 2024-04-23 LAB
ANION GAP SERPL CALCULATED.3IONS-SCNC: 5 MMOL/L (ref 3–14)
BUN SERPL-MCNC: 22 MG/DL (ref 7–30)
CALCIUM SERPL-MCNC: 9.3 MG/DL (ref 8.5–10.1)
CHLORIDE BLD-SCNC: 107 MMOL/L (ref 94–109)
CO2 SERPL-SCNC: 25 MMOL/L (ref 20–32)
CREAT SERPL-MCNC: 1.5 MG/DL (ref 0.66–1.25)
EGFRCR SERPLBLD CKD-EPI 2021: 49 ML/MIN/1.73M2
GLUCOSE BLD-MCNC: 194 MG/DL (ref 70–99)
POTASSIUM BLD-SCNC: 3.8 MMOL/L (ref 3.4–5.3)
SODIUM SERPL-SCNC: 137 MMOL/L (ref 135–145)

## 2024-04-23 PROCEDURE — G2211 COMPLEX E/M VISIT ADD ON: HCPCS | Performed by: FAMILY MEDICINE

## 2024-04-23 PROCEDURE — 84550 ASSAY OF BLOOD/URIC ACID: CPT | Performed by: FAMILY MEDICINE

## 2024-04-23 PROCEDURE — 80061 LIPID PANEL: CPT | Performed by: FAMILY MEDICINE

## 2024-04-23 PROCEDURE — 99215 OFFICE O/P EST HI 40 MIN: CPT | Performed by: FAMILY MEDICINE

## 2024-04-23 PROCEDURE — 86706 HEP B SURFACE ANTIBODY: CPT | Mod: GZ | Performed by: FAMILY MEDICINE

## 2024-04-23 PROCEDURE — 80048 BASIC METABOLIC PNL TOTAL CA: CPT | Performed by: FAMILY MEDICINE

## 2024-04-23 PROCEDURE — 87340 HEPATITIS B SURFACE AG IA: CPT | Mod: GZ | Performed by: FAMILY MEDICINE

## 2024-04-23 PROCEDURE — 86803 HEPATITIS C AB TEST: CPT | Performed by: FAMILY MEDICINE

## 2024-04-23 PROCEDURE — 36415 COLL VENOUS BLD VENIPUNCTURE: CPT | Performed by: FAMILY MEDICINE

## 2024-04-23 PROCEDURE — 86704 HEP B CORE ANTIBODY TOTAL: CPT | Mod: GZ | Performed by: FAMILY MEDICINE

## 2024-04-23 RX ORDER — ALLOPURINOL 100 MG/1
200 TABLET ORAL DAILY
Qty: 180 TABLET | Refills: 3 | Status: SHIPPED | OUTPATIENT
Start: 2024-04-23

## 2024-04-23 RX ORDER — LISINOPRIL 10 MG/1
10 TABLET ORAL DAILY
Qty: 90 TABLET | Refills: 1 | Status: SHIPPED | OUTPATIENT
Start: 2024-04-23

## 2024-04-23 RX ORDER — ACETAMINOPHEN 500 MG
500-1000 TABLET ORAL EVERY 6 HOURS PRN
Qty: 100 TABLET | Refills: 3 | Status: SHIPPED | OUTPATIENT
Start: 2024-04-23 | End: 2024-09-17 | Stop reason: DRUGHIGH

## 2024-04-23 ASSESSMENT — ENCOUNTER SYMPTOMS: CONSTITUTIONAL NEGATIVE: 1

## 2024-04-23 NOTE — PROGRESS NOTES
Assessment and Plan     1. Pain of left hand  Likely arthritis but does have some clicking that could suggest a small trigger. Without any overt triggering symptoms, won't do anything additional like an injection at this time. Can try hand therapy and tylenol to keep him engaged in his flute playing and gardening.  - Occupational Therapy  Referral; Future  - acetaminophen (TYLENOL) 500 MG tablet; Take 1-2 tablets (500-1,000 mg) by mouth every 6 hours as needed for mild pain  Dispense: 100 tablet; Refill: 3    2. Benign essential hypertension  Controlled. Continue current management. Recheck in 6 months. His creatinine is elevated from previous but notes that he hasn't had any water yet to drink today. Instructed him to push fluids and recheck in 2-4 weeks.  - Basic metabolic panel; Future  - lisinopril (ZESTRIL) 10 MG tablet; Take 1 tablet (10 mg) by mouth daily  Dispense: 90 tablet; Refill: 1  - Basic metabolic panel    3. Hyperlipidemia, unspecified hyperlipidemia type  On high intensity statin therapy and tolerating well. Await recheck lipids.  - Lipid panel; Future  - Lipid panel    4. Chronic gouty arthropathy  Controlled. Continue current management. Was very near goal uric acid of <6 at last check. Additionally, has not had any flares. Await recheck uric acid.   - Uric acid; Future  - allopurinol (ZYLOPRIM) 100 MG tablet; Take 2 tablets (200 mg) by mouth daily  Dispense: 180 tablet; Refill: 3  - Uric acid    5. Need for hepatitis C screening test  - Hepatitis C Screen Reflex to HCV RNA Quant and Genotype; Future  - Hepatitis C Screen Reflex to HCV RNA Quant and Genotype    6. Need for hepatitis B screening test  - Hepatitis B core antibody; Future  - Hepatitis B Surface Antibody; Future  - Hepatitis B surface antigen; Future  - Hepatitis B core antibody  - Hepatitis B Surface Antibody  - Hepatitis B surface antigen      45 minutes spent on the date of the encounter doing chart review, history and  exam, documentation, and further activities as noted above.    The longitudinal plan of care for the diagnosis(es)/condition(s) as documented were addressed during this visit. Due to the added complexity in care, I will continue to support Cher in the subsequent management and with ongoing continuity of care.    Options for treatment and follow-up care were reviewed with the patient and/or guardian. Cher Rae and/or guardian engaged in the decision making process and verbalized understanding of the options discussed and agreed with the final plan. I answered all of their questions.    This note was created with the aid of dictation software. Any mistakes are unintentional.    George Saenz III, MD, FAAFP  [facility]  04/23/24 1:02 PM           HPI:       Cher Rae is a 74 year old  male  Patient presents with:  Refill Request  Referral: Physical therapy referral for hand    Complains of left hand pain for a long time (2020). Especially in the pinky finger. Pain radiates up to his shoulder. No median nerve numbness. Denies triggering/locking. The pain gets in the way of his flute playing.    The patient denies signs and symptoms of orthostatic hypotension. Has been taking his lisinopril.    Has not had a recent gout flare and has been on the allopurinol.    Tolerating his statin without issue and has been taking.    Accepts HBV and HCV screening.    Is getting ready to get his garden going. Likes cucumbers and [employer] herbs.    A [employer]  was used for this visit         PMHX:     Patient Active Problem List   Diagnosis    Chronic Gout    Hyperlipidemia    Cerebrovascular disease    Benign Polyps Of The Large Intestine    Joint Pain, Localized In The Left Shoulder    Hyperlipidemia    Blurry Vision    Benign essential hypertension    Seasonal allergic rhinitis due to pollen       Current Outpatient Medications   Medication Sig Dispense Refill    acetaminophen (TYLENOL) 500 MG tablet  Take 1-2 tablets (500-1,000 mg) by mouth every 6 hours as needed for mild pain 100 tablet 3    allopurinol (ZYLOPRIM) 100 MG tablet Take 2 tablets (200 mg) by mouth daily 180 tablet 3    lisinopril (ZESTRIL) 10 MG tablet Take 1 tablet (10 mg) by mouth daily 90 tablet 1    atorvastatin (LIPITOR) 40 MG tablet Take 1 tablet (40 mg) by mouth daily 90 tablet 3    azelastine (OPTIVAR) 0.05 % ophthalmic solution Place 1 drop into both eyes 2 times daily as needed (Itchy eyes) 6 mL 11    fluticasone (FLONASE) 50 MCG/ACT nasal spray Spray 2 sprays into both nostrils daily (Patient not taking: Reported on 2023) 48 g 3       Social History     Socioeconomic History    Marital status:      Spouse name: Not on file    Number of children: Not on file    Years of education: Not on file    Highest education level: Not on file   Occupational History    Not on file   Tobacco Use    Smoking status: Never    Smokeless tobacco: Never   Vaping Use    Vaping status: Never Used   Substance and Sexual Activity    Alcohol use: Not Currently    Drug use: Not Currently    Sexual activity: Not on file   Other Topics Concern    Not on file   Social History Narrative    Has been back forth from [place] and [place] across the years. He originally immigrated to [place]. Has been back in [place] since summer 2020.        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        He really likes to write. Writes lots of things about [facility] history.        Has 11 children (one ) and 15 grandchildren (9 in [place]).        Retired.        Likes to play the flute. Gardens [facility] herbs and cucumbers especially.     Social Determinants of Health     Financial Resource Strain: Not on file   Food Insecurity: Not on file   Transportation Needs: Not on file   Physical Activity: Not on file   Stress: Not on file   Social Connections: Not on file   Interpersonal Safety: Low Risk  (2024)    Interpersonal Safety     Do you feel  "physically and emotionally safe where you currently live?: Yes     Within the past 12 months, have you been hit, slapped, kicked or otherwise physically hurt by someone?: No     Within the past 12 months, have you been humiliated or emotionally abused in other ways by your partner or ex-partner?: No   Housing Stability: Not on file       No Known Allergies    Results for orders placed or performed in visit on 04/23/24 (from the past 24 hour(s))   Basic metabolic panel   Result Value Ref Range    Sodium 137 135 - 145 mmol/L    Potassium 3.8 3.4 - 5.3 mmol/L    Chloride 107 94 - 109 mmol/L    Carbon Dioxide (CO2) 25 20 - 32 mmol/L    Anion Gap 5 3 - 14 mmol/L    Urea Nitrogen 22 7 - 30 mg/dL    Creatinine 1.50 (H) 0.66 - 1.25 mg/dL    GFR Estimate 49 (L) >60 mL/min/1.73m2    Calcium 9.3 8.5 - 10.1 mg/dL    Glucose 194 (H) 70 - 99 mg/dL       Uric Acid   Date Value Ref Range Status   11/08/2022 6.1 3.4 - 7.0 mg/dL Final   06/15/2021 7.2 3.0 - 8.0 mg/dL Final             Review of Systems:     Review of Systems   Constitutional: Negative.             Physical Exam:     Vitals:    04/23/24 1402   BP: 128/78   Pulse: 73   Temp: 97.9  F (36.6  C)   TempSrc: Oral   SpO2: 96%   Weight: 74.4 kg (164 lb)   Height: 1.55 m (5' 1.02\")     Body mass index is 30.96 kg/m .    Physical Exam  Vitals and nursing note reviewed.   Constitutional:       General: He is not in acute distress.     Appearance: Normal appearance. He is not ill-appearing, toxic-appearing or diaphoretic.   Cardiovascular:      Rate and Rhythm: Normal rate and regular rhythm.      Pulses: Normal pulses.      Heart sounds: Murmur heard.      Systolic murmur is present with a grade of 2/6.   Pulmonary:      Effort: No respiratory distress.   Musculoskeletal:      Left hand: No swelling, deformity, lacerations, tenderness or bony tenderness. Normal strength. Normal sensation. Normal pulse.      Comments: No bulge of the pinky flexor felt but was able to palpate " clicking near the a1 pulley   Neurological:      Mental Status: He is alert and oriented to person, place, and time.

## 2024-04-24 LAB
CHOLEST SERPL-MCNC: 168 MG/DL
FASTING STATUS PATIENT QL REPORTED: NO
HBV CORE AB SERPL QL IA: NONREACTIVE
HBV SURFACE AB SERPL IA-ACNC: 309 M[IU]/ML
HBV SURFACE AB SERPL IA-ACNC: REACTIVE M[IU]/ML
HBV SURFACE AG SERPL QL IA: NONREACTIVE
HCV AB SERPL QL IA: NONREACTIVE
HDLC SERPL-MCNC: 36 MG/DL
LDLC SERPL CALC-MCNC: 66 MG/DL
NONHDLC SERPL-MCNC: 132 MG/DL
TRIGL SERPL-MCNC: 331 MG/DL
URATE SERPL-MCNC: 5.7 MG/DL (ref 3.4–7)

## 2024-05-07 ENCOUNTER — OFFICE VISIT (OUTPATIENT)
Dept: FAMILY MEDICINE | Facility: CLINIC | Age: 74
End: 2024-05-07
Payer: MEDICARE

## 2024-05-07 VITALS
DIASTOLIC BLOOD PRESSURE: 73 MMHG | HEIGHT: 61 IN | SYSTOLIC BLOOD PRESSURE: 126 MMHG | TEMPERATURE: 98.1 F | WEIGHT: 165 LBS | OXYGEN SATURATION: 96 % | HEART RATE: 62 BPM | BODY MASS INDEX: 31.15 KG/M2 | RESPIRATION RATE: 22 BRPM

## 2024-05-07 DIAGNOSIS — J30.1 SEASONAL ALLERGIC RHINITIS DUE TO POLLEN: ICD-10-CM

## 2024-05-07 DIAGNOSIS — R79.89 ELEVATED SERUM CREATININE: ICD-10-CM

## 2024-05-07 DIAGNOSIS — I10 BENIGN ESSENTIAL HYPERTENSION: Primary | ICD-10-CM

## 2024-05-07 LAB
ANION GAP SERPL CALCULATED.3IONS-SCNC: 6 MMOL/L (ref 3–14)
BUN SERPL-MCNC: 16 MG/DL (ref 7–30)
CALCIUM SERPL-MCNC: 9.8 MG/DL (ref 8.5–10.1)
CHLORIDE BLD-SCNC: 107 MMOL/L (ref 94–109)
CO2 SERPL-SCNC: 25 MMOL/L (ref 20–32)
CREAT SERPL-MCNC: 1.2 MG/DL (ref 0.66–1.25)
EGFRCR SERPLBLD CKD-EPI 2021: 64 ML/MIN/1.73M2
GLUCOSE BLD-MCNC: 164 MG/DL (ref 70–99)
POTASSIUM BLD-SCNC: 3.6 MMOL/L (ref 3.4–5.3)
SODIUM SERPL-SCNC: 138 MMOL/L (ref 135–145)

## 2024-05-07 PROCEDURE — G2211 COMPLEX E/M VISIT ADD ON: HCPCS | Performed by: FAMILY MEDICINE

## 2024-05-07 PROCEDURE — 36415 COLL VENOUS BLD VENIPUNCTURE: CPT | Performed by: FAMILY MEDICINE

## 2024-05-07 PROCEDURE — 80048 BASIC METABOLIC PNL TOTAL CA: CPT | Performed by: FAMILY MEDICINE

## 2024-05-07 PROCEDURE — 99214 OFFICE O/P EST MOD 30 MIN: CPT | Performed by: FAMILY MEDICINE

## 2024-05-07 RX ORDER — AZELASTINE HYDROCHLORIDE 0.5 MG/ML
1 SOLUTION/ DROPS OPHTHALMIC 2 TIMES DAILY PRN
Qty: 6 ML | Refills: 11 | Status: SHIPPED | OUTPATIENT
Start: 2024-05-07 | End: 2024-08-20

## 2024-05-07 ASSESSMENT — ENCOUNTER SYMPTOMS: CONSTITUTIONAL NEGATIVE: 1

## 2024-05-07 NOTE — PROGRESS NOTES
Assessment and Plan     1. Benign essential hypertension  Controlled. Continue current management. Recheck in 6 months.    2. Elevated serum creatinine  Back to baseline. No further needs. Recheck lytes next year.  - Basic metabolic panel; Future  - Basic metabolic panel    3. Seasonal allergic rhinitis due to pollen  Continue. Restart flonase and he has plenty at home (no need for prescription today).  - azelastine (OPTIVAR) 0.05 % ophthalmic solution; Place 1 drop into both eyes 2 times daily as needed (Itchy eyes)  Dispense: 6 mL; Refill: 11    The longitudinal plan of care for the diagnosis(es)/condition(s) as documented were addressed during this visit. Due to the added complexity in care, I will continue to support Cher in the subsequent management and with ongoing continuity of care.    Options for treatment and follow-up care were reviewed with the patient and/or guardian. Cher Rae and/or guardian engaged in the decision making process and verbalized understanding of the options discussed and agreed with the final plan. I answered all of their questions.    This note was created with the aid of dictation software. Any mistakes are unintentional.    George Saenz III, MD, FAAFP  [facility]  05/07/24 2:20 PM           HPI:       Cher Rae is a 74 year old  male  Patient presents with:  recheck kidneys  Refill Request: Eye drops    Is here to follow up on his kidneys. At last visit, his creatinine was elevated without any other symptoms or BP med changes. Had noted that he had drank no water that day when he normally would have. Has not taken his lisinopril today. Has been drinking more water, like he normally does, and his pee is more clear.    Needs more allergy eye drops.    A [employer]  was used for this visit         PMHX:     Patient Active Problem List   Diagnosis    Chronic Gout    Hyperlipidemia    Cerebrovascular disease    Benign Polyps Of The Large Intestine     Joint Pain, Localized In The Left Shoulder    Hyperlipidemia    Blurry Vision    Benign essential hypertension    Seasonal allergic rhinitis due to pollen       Current Outpatient Medications   Medication Sig Dispense Refill    azelastine (OPTIVAR) 0.05 % ophthalmic solution Place 1 drop into both eyes 2 times daily as needed (Itchy eyes) 6 mL 11    fluticasone (FLONASE) 50 MCG/ACT nasal spray Spray 2 sprays into both nostrils daily 48 g 3    acetaminophen (TYLENOL) 500 MG tablet Take 1-2 tablets (500-1,000 mg) by mouth every 6 hours as needed for mild pain 100 tablet 3    allopurinol (ZYLOPRIM) 100 MG tablet Take 2 tablets (200 mg) by mouth daily 180 tablet 3    atorvastatin (LIPITOR) 40 MG tablet Take 1 tablet (40 mg) by mouth daily 90 tablet 3    lisinopril (ZESTRIL) 10 MG tablet Take 1 tablet (10 mg) by mouth daily 90 tablet 1       Social History     Socioeconomic History    Marital status:      Spouse name: Not on file    Number of children: Not on file    Years of education: Not on file    Highest education level: Not on file   Occupational History    Not on file   Tobacco Use    Smoking status: Never    Smokeless tobacco: Never   Vaping Use    Vaping status: Never Used   Substance and Sexual Activity    Alcohol use: Not Currently    Drug use: Not Currently    Sexual activity: Not on file   Other Topics Concern    Not on file   Social History Narrative    Has been back forth from [place] and [place] across the years. He originally immigrated to [place]. Has been back in [place] since summer 2020.        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        He really likes to write. Writes lots of things about [facility] history.        Has 11 children (one ) and 15 grandchildren (9 in [place]).        Retired.        Likes to play the flute. Gardens [facility] herbs and cucumbers especially.     Social Determinants of Health     Financial Resource Strain: Not on file   Food Insecurity: Not on  "file   Transportation Needs: Not on file   Physical Activity: Not on file   Stress: Not on file   Social Connections: Not on file   Interpersonal Safety: Low Risk  (4/23/2024)    Interpersonal Safety     Do you feel physically and emotionally safe where you currently live?: Yes     Within the past 12 months, have you been hit, slapped, kicked or otherwise physically hurt by someone?: No     Within the past 12 months, have you been humiliated or emotionally abused in other ways by your partner or ex-partner?: No   Housing Stability: Not on file       No Known Allergies    Results for orders placed or performed in visit on 05/07/24 (from the past 24 hour(s))   Basic metabolic panel   Result Value Ref Range    Sodium 138 135 - 145 mmol/L    Potassium 3.6 3.4 - 5.3 mmol/L    Chloride 107 94 - 109 mmol/L    Carbon Dioxide (CO2) 25 20 - 32 mmol/L    Anion Gap 6 3 - 14 mmol/L    Urea Nitrogen 16 7 - 30 mg/dL    Creatinine 1.20 0.66 - 1.25 mg/dL    GFR Estimate 64 >60 mL/min/1.73m2    Calcium 9.8 8.5 - 10.1 mg/dL    Glucose 164 (H) 70 - 99 mg/dL            Review of Systems:     Review of Systems   Constitutional: Negative.             Physical Exam:     Vitals:    05/07/24 1400 05/07/24 1403   BP: (!) 145/80 126/73   BP Location:  Right arm   Pulse: 62    Resp:  22   Temp: 98.1  F (36.7  C)    TempSrc: Oral    SpO2: 96%    Weight: 74.8 kg (165 lb)    Height: 1.549 m (5' 1\")      Body mass index is 31.18 kg/m .    Physical Exam  Vitals and nursing note reviewed.   Constitutional:       General: He is not in acute distress.     Appearance: Normal appearance. He is not ill-appearing, toxic-appearing or diaphoretic.   Pulmonary:      Effort: No respiratory distress.   Neurological:      Mental Status: He is alert and oriented to person, place, and time.           "

## 2024-06-23 ENCOUNTER — HEALTH MAINTENANCE LETTER (OUTPATIENT)
Age: 74
End: 2024-06-23

## 2024-06-27 ENCOUNTER — OFFICE VISIT (OUTPATIENT)
Dept: FAMILY MEDICINE | Facility: CLINIC | Age: 74
End: 2024-06-27
Payer: MEDICARE

## 2024-06-27 VITALS
WEIGHT: 165 LBS | SYSTOLIC BLOOD PRESSURE: 108 MMHG | RESPIRATION RATE: 29 BRPM | TEMPERATURE: 98.1 F | HEART RATE: 70 BPM | BODY MASS INDEX: 32.39 KG/M2 | OXYGEN SATURATION: 96 % | DIASTOLIC BLOOD PRESSURE: 69 MMHG | HEIGHT: 60 IN

## 2024-06-27 DIAGNOSIS — R06.83 SNORING: ICD-10-CM

## 2024-06-27 DIAGNOSIS — Z71.84 TRAVEL ADVICE ENCOUNTER: Primary | ICD-10-CM

## 2024-06-27 PROCEDURE — 99213 OFFICE O/P EST LOW 20 MIN: CPT | Mod: GC

## 2024-06-27 RX ORDER — RESPIRATORY SYNCYTIAL VIRUS VACCINE 120MCG/0.5
0.5 KIT INTRAMUSCULAR ONCE
Qty: 1 EACH | Refills: 0 | Status: CANCELLED | OUTPATIENT
Start: 2024-06-27 | End: 2024-06-27

## 2024-06-27 NOTE — PROGRESS NOTES
Assessment & Plan     Travel advice encounter  Traveling to resort area in [place] with family. No malaria prophylaxis needed. Declined diarrhea medications. Vaccines up to date.   I have reviewed general recommendations for safe travel including: food/water precautions, insect avoidance, roadway safety. Educational materials and links to the [facility] Traveler's health website have been provided.    Snoring  Stop bang of 6. Has been referred for sleep study in the past but never had it done as does not remember being called. Will get that resent today. Contact information is correct in the chart.   - Sleep Study Referral; Future      The longitudinal plan of care for the diagnosis(es)/condition(s) as documented were addressed during this visit. Due to the added complexity in care, I will continue to support Cher in the subsequent management and with ongoing continuity of care.        Bert Kwon is a 74 year old, presenting for the following health issues:  Travel and Lesion (On under arm)      6/27/2024    10:01 AM   Additional Questions   Roomed by Joey/Ely         6/27/2024    Information    services provided? Yes   Language Hmong   Type of interpretation provided Face-to-face    name Molly Esparza    Agency [employer]    phone number [phone]        HPI   Travel visit  Going to a resort town in [place] with daughter --unsure where  Feeling well      Itinerary: (List all countries)  [place] -- [facility] city, unsure which city  Departure Date: 7/1, Return Date: 7/8  Reason for Travel (i.e. business, pleasure): pleasure  Visiting an urban or rural area? Urban [facility] town  Accommodations (i.e. hotel, hostel, friends, family etc.): resort      IMMUNIZATION HISTORY  Have you received any vaccinations in the past 4 weeks?  No   Have you ever fainted from having your blood drawn or from an injection?  No  Have you had any bad reaction / side effect from any vaccination?   "No  Have you ever had hepatitis A or B vaccine?  No  Do you live (or work closely with anyone who has AIDS, or any other immune disorder, or who is on chemotherapy for cancer or family history of immunodeficiency?  No  Have you received any injection of immune globulin or any blood products during the past 12 months?  No    GENERAL MEDICAL HISTORY  Do you have a medical condition that warrants maintenance meds or physician follow-up?  Yes  Do you have a medical condition that is stable now, but that may recur while traveling?  Yes  Has your spleen been removed?   No  Have you had an acute illness or a fever in the past 48 hours?  No  Do you have HIV, AIDS, an AIDS-like condition, any other immune disorder, leukemia or cancer?  No  Do you have a severe combined immunodeficiency disease?  No  Do you have severe thrombocytopenia (low platelet count) or blood clotting disorder?  No  Have you ever had a convulsion, seizure, epilepsy, neurologic condition or brain infection?  No  Do you have any stomach conditions?  No  Do you have severe renal or kidney impairment?  Yes  Do you have a history of psychiatric problems?  No      ALLERGIES  ARE YOU ALLERGIC TO:  Any medications?  No  Any foods or other?  No          Objective    /69 (BP Location: Right arm, Patient Position: Sitting)   Pulse 70   Temp 98.1  F (36.7  C) (Oral)   Resp 29   Ht 1.51 m (4' 11.45\")   Wt 74.8 kg (165 lb)   SpO2 96%   BMI 32.82 kg/m    Body mass index is 32.82 kg/m .  Physical Exam   GENERAL: Healthy, alert and no distress  EYES: Eyes grossly normal to inspection.    RESP:  Lungs clear throughout. No wheeze or crackles.   CV: Heart RRR. Soft systolic murmur  Abdomen: Soft, nontender, nondistended.  MSK: No gross deformity. Normal tone.  SKIN: Visible skin clear. No significant rash, abnormal pigmentation or lesions.  NEURO: Cranial nerves grossly intact.  Mentation and speech appropriate for age.  PSYCH: Mentation appears normal, affect " normal/bright, judgement and insight intact, normal speech and appearance well-groomed.          Signed Electronically by: Kathy Jeffrey MD

## 2024-06-27 NOTE — PROGRESS NOTES
Preceptor Attestation:   Patient seen, evaluated and discussed with the resident Dr. Kathy Jeffrey. I have verified the content of the note, which accurately reflects my assessment of the patient and the plan of care.    Supervising Physician:  Benjamin Rosenstein, MD, MA  [facility]  [facility]

## 2024-07-16 ENCOUNTER — OFFICE VISIT (OUTPATIENT)
Dept: FAMILY MEDICINE | Facility: CLINIC | Age: 74
End: 2024-07-16
Payer: MEDICARE

## 2024-07-16 VITALS
HEART RATE: 64 BPM | DIASTOLIC BLOOD PRESSURE: 83 MMHG | WEIGHT: 168 LBS | BODY MASS INDEX: 31.72 KG/M2 | HEIGHT: 61 IN | OXYGEN SATURATION: 97 % | SYSTOLIC BLOOD PRESSURE: 126 MMHG

## 2024-07-16 DIAGNOSIS — M54.50 ACUTE RIGHT-SIDED LOW BACK PAIN WITHOUT SCIATICA: Primary | ICD-10-CM

## 2024-07-16 PROCEDURE — 99213 OFFICE O/P EST LOW 20 MIN: CPT | Mod: GC

## 2024-07-16 RX ORDER — NAPROXEN 500 MG/1
500 TABLET ORAL 2 TIMES DAILY PRN
Qty: 30 TABLET | Refills: 0 | Status: SHIPPED | OUTPATIENT
Start: 2024-07-16 | End: 2024-07-16

## 2024-07-16 RX ORDER — ACETAMINOPHEN 500 MG
1000 TABLET ORAL 3 TIMES DAILY
Qty: 90 TABLET | Refills: 0 | Status: SHIPPED | OUTPATIENT
Start: 2024-07-16

## 2024-07-16 RX ORDER — RESPIRATORY SYNCYTIAL VIRUS VACCINE 120MCG/0.5
0.5 KIT INTRAMUSCULAR ONCE
Qty: 1 EACH | Refills: 0 | Status: CANCELLED | OUTPATIENT
Start: 2024-07-16 | End: 2024-07-16

## 2024-07-16 ASSESSMENT — PATIENT HEALTH QUESTIONNAIRE - PHQ9
SUM OF ALL RESPONSES TO PHQ QUESTIONS 1-9: 0
10. IF YOU CHECKED OFF ANY PROBLEMS, HOW DIFFICULT HAVE THESE PROBLEMS MADE IT FOR YOU TO DO YOUR WORK, TAKE CARE OF THINGS AT HOME, OR GET ALONG WITH OTHER PEOPLE: NOT DIFFICULT AT ALL
SUM OF ALL RESPONSES TO PHQ QUESTIONS 1-9: 0

## 2024-07-16 NOTE — PROGRESS NOTES
Assessment & Plan     Acute right-sided low back pain without sciatica  1.5 weeks of right low back pain that began with a pulling sensation while stepping out of the shower. No injury/fall. No red flag symptoms (bowel/bladder incont, saddle anesthesia). No radiation of pain. Exam unremarkable apart from tight back muscles and tenderness to palpation of right low back. Likely back strain/pulled muscle. Pain is manageable for patient - will start tylenol. Provided stretching exercises.   - Scheduled tylenol   - Cannot use NSAIDs due to ulcer history   - patient does not feel he needs additional pain medications   - Ice, heat, massage  - Provided stretching exercises  - acetaminophen (TYLENOL) 500 MG tablet; Take 2 tablets (1,000 mg) by mouth 3 times daily    Follow-up if no improvement in 2 weeks or if worsening.     Bert Kwon is a 74 year old, presenting for the following health issues:  Musculoskeletal Problem (R side hip pain. On Friday after shower he trying getting out of bathtub and he heard a crack in back. Since then he has been having pain. Describes pain as a soreness/Pt unsure if pain is due to bowling, Saturday 7th of July. Felt no pain days after bowling.)        7/18/2024     2:16 PM   Additional Questions   Roomed by Bridgett   Accompanied by PAYAM HANKS     Acute right sided low back pain.   Began last Friday (about 1.5 weeks ago) after making a strange movement while getting out of the shower - he felt something pull/stretch.   Did not fall. No recent injuries.   Has used topical Hmong treatment. No PO medications.   Not super bothersome/painful - he just wants to make sure it's nothing serious/concerning.   No changes in bowel or bladder function.   No saddle anesthesia.   No radiation of pain down legs or elsewhere.   History of gastric ulcers so cannot use NSAIDs.         Objective    /83 (BP Location: Right arm, Patient Position: Sitting, Cuff Size: Adult Regular)   Pulse 64   Ht  "1.551 m (5' 1.06\")   Wt 76.2 kg (168 lb)   SpO2 97%   BMI 31.68 kg/m    Body mass index is 31.68 kg/m .  Physical Exam   GENERAL: alert and no distress  NECK: no adenopathy, no asymmetry, masses, or scars  RESP: lungs clear to auscultation - no rales, rhonchi or wheezes  CV: regular rate and rhythm, normal S1 S2, no S3 or S4, no murmur, click or rub, no peripheral edema  ABDOMEN: soft, nontender, no hepatosplenomegaly, no masses and bowel sounds normal  MS: no gross musculoskeletal defects noted, no edema  NEURO: Normal strength and tone, mentation intact and speech normal  BACK:   ROM: flexion -full /extension -full /lateral rotation- full /side bend- full   Bony tenderness: None   Paraspinal tenderness: yes on right low back, muscles feel very tight.   Sensation: intact in b/l lower extremities.   Strength: 5/5 w/ dorsiflexion/ plantarflexion/ inversion/ eversion/ knee flexion/ extension.   Maneuvers: Neg straight leg raise b/l.   Neuro: DTR's 2+. Babinski's downgoing. Neg Clonus           Signed Electronically by: Bryan Gomez MD    "

## 2024-07-16 NOTE — PATIENT INSTRUCTIONS
For Back Pain:     - Scheduled tylenol 1000 mg three times daily   - Use ice and heat as needed   - Do gentle back stretches  - Massage would be helpful     Return to clinic if your back pain is not improving within 2 weeks

## 2024-07-16 NOTE — PROGRESS NOTES
Preceptor Attestation:   Patient seen, evaluated and discussed with the resident Dr. Rosa Tang. I have verified the content of the note, which accurately reflects my assessment of the patient and the plan of care.    Supervising Physician:  Benjamin Rosenstein, MD, MA  [facility]  [facility]

## 2024-07-18 ENCOUNTER — OFFICE VISIT (OUTPATIENT)
Dept: FAMILY MEDICINE | Facility: CLINIC | Age: 74
End: 2024-07-18
Payer: MEDICARE

## 2024-07-18 VITALS
DIASTOLIC BLOOD PRESSURE: 80 MMHG | HEART RATE: 72 BPM | SYSTOLIC BLOOD PRESSURE: 153 MMHG | BODY MASS INDEX: 31.53 KG/M2 | RESPIRATION RATE: 19 BRPM | HEIGHT: 61 IN | OXYGEN SATURATION: 97 % | TEMPERATURE: 97.1 F | WEIGHT: 167 LBS

## 2024-07-18 DIAGNOSIS — K59.00 CONSTIPATION, UNSPECIFIED CONSTIPATION TYPE: Primary | ICD-10-CM

## 2024-07-18 PROCEDURE — 99213 OFFICE O/P EST LOW 20 MIN: CPT | Mod: GC

## 2024-07-18 RX ORDER — POLYETHYLENE GLYCOL 3350 17 G/17G
1 POWDER, FOR SOLUTION ORAL 2 TIMES DAILY
Qty: 238 G | Refills: 1 | Status: SHIPPED | OUTPATIENT
Start: 2024-07-18 | End: 2024-09-17

## 2024-07-18 NOTE — PROGRESS NOTES
"  Assessment & Plan     Cher is a 74 year old Hmong speaking gentleman presenting with symptoms of constipation    Constipation, unspecified constipation type  Discussed eating more vegetables and drinking plenty of water every day.  Patient reports he did have a recent normal colonoscopy in 2022.  - polyethylene glycol (MIRALAX) 17 GM/Dose powder  Dispense: 238 g; Refill: 1        Subjective   Cher is a 74 year old, presenting for the following health issues:  Possible acid reflex (Feeling bloating and gassy)    Feels very bloated last 2 days  Wants to check make sure everything is OK    All the time, whether he eats or not    BM:  BM every 2 days  stool is hard, not a lot when he has BM, no diarrhea  Bought over the counter medicine tablets that bubbles in water  Drank some medicine for diarrhea, bought at [facility], was not having diarrhea bu thought it might help with bloating  No bloody stools  No black stools          7/18/2024     2:16 PM   Additional Questions   Roomed by Bridgett   Accompanied by PAYAM         7/18/2024    Information    services provided? Yes   Language Hmong   Type of interpretation provided Telephone    name Unkown    ID NA    [employer]    phone number [phone]              Objective    BP (!) 153/80   Pulse 72   Temp 97.1  F (36.2  C)   Resp 19   Ht 1.549 m (5' 1\")   Wt 75.8 kg (167 lb)   SpO2 97%   BMI 31.55 kg/m    Body mass index is 31.55 kg/m .  Physical Exam   GENERAL: alert and no distress  RESP: breathing comfortably on room air  CV: regular rate and rhythm, normal S1 S2, no S3 or S4, no murmur, click or rub, no peripheral edema  ABDOMEN: soft, nontender, mildly distended, bowel sounds normal  MS: no gross musculoskeletal defects noted, no edema          Signed Electronically by: Jackson Green MD    "

## 2024-07-21 NOTE — PROGRESS NOTES
Preceptor Attestation:  Patient's case reviewed and discussed with Jackson Green MD resident and I evaluated the patient. I agree with written assessment and plan of care.  Supervising Physician:  Patrice Jackson MD, MD [facility]  [facility]

## 2024-07-25 ENCOUNTER — OFFICE VISIT (OUTPATIENT)
Dept: FAMILY MEDICINE | Facility: CLINIC | Age: 74
End: 2024-07-25
Payer: MEDICARE

## 2024-07-25 VITALS
HEIGHT: 61 IN | TEMPERATURE: 98.3 F | DIASTOLIC BLOOD PRESSURE: 82 MMHG | RESPIRATION RATE: 18 BRPM | WEIGHT: 161 LBS | SYSTOLIC BLOOD PRESSURE: 127 MMHG | HEART RATE: 67 BPM | OXYGEN SATURATION: 98 % | BODY MASS INDEX: 30.4 KG/M2

## 2024-07-25 DIAGNOSIS — K92.1 BLACK STOOL: Primary | ICD-10-CM

## 2024-07-25 DIAGNOSIS — Z87.11 HISTORY OF BLEEDING PEPTIC ULCER: ICD-10-CM

## 2024-07-25 DIAGNOSIS — Z29.11 NEED FOR VACCINATION AGAINST RESPIRATORY SYNCYTIAL VIRUS: ICD-10-CM

## 2024-07-25 LAB — HGB BLD-MCNC: 13 G/DL (ref 13.3–17.7)

## 2024-07-25 PROCEDURE — 99213 OFFICE O/P EST LOW 20 MIN: CPT | Mod: GC

## 2024-07-25 PROCEDURE — 36415 COLL VENOUS BLD VENIPUNCTURE: CPT

## 2024-07-25 PROCEDURE — 85018 HEMOGLOBIN: CPT

## 2024-07-25 NOTE — PROGRESS NOTES
"  Assessment & Plan     Cher is a 74 year old Hmong speaking gentleman presenting with the following problem.    History of bleeding peptic ulcer  Epigastric pain  Bloating  Black stool  2 weeks of feeling bloated.  I seen him in clinic 1 week ago and he did mention taking a medicine that bubbles in water.  I am wondering if this was pepto-bismol that he was taking to try to help with bloating.  He comes back today, now complaining of 1 week of black stools.  He has a history of Stomach ulcer induced by aspirin use in 2002, and I am concerned this may be another ulcer.  - Hemoglobin  - Adult GI  Referral - Procedure Only  - omeprazole (PRILOSEC) 20 MG DR capsule  Dispense: 30 capsule; Refill: 0  - Follow-up in 2 weeks        Subjective   Cher is a 74 year old, presenting for the following health issues:  Bloated and Black Stool    Stomach is not getting better  Still bloated and uncomfortable  Stool black after starting mirilax  no bright red blood, just black  After taking powder, stool is pudding consistency and black 1-2 X day    Little bit dizzy, since taking the powder    Has never had this before    No N/V, just eating less    Epigastric pain  Feels full    Hx ibuprofen use prior ibuprofen use  Hx big stomach ulcer [id number] caused by aspirin            7/25/2024     2:33 PM   Additional Questions   Roomed by Dayan         7/25/2024    Information    services provided? Yes   Language Hmong   Type of interpretation provided Face-to-face    name Yeng    [employer]ency [employer]            Objective    /82   Pulse 67   Temp 98.3  F (36.8  C)   Resp 18   Ht 1.54 m (5' 0.63\")   Wt 73 kg (161 lb)   SpO2 98%   BMI 30.79 kg/m    Body mass index is 30.79 kg/m .    Physical Exam     GEN: Pleasant male, in no acute distress.   HEENT: Normocephalic, atraumatic. Extraoccular eye movements intact. Anicteric sclera. Moist mucous membranes.   PULM: Non-labored " breathing. No use of accessory muscles.   ABDOMEN: Normoactive bowel sounds. Mild discomfort on palpation. Non-tender to palpation. Non-distended.    EXTREMITES:  No clubbing, cyanosis, or edema.    SKIN: No rash on limited skin exam  NEURO:  Awake. Oriented   PSYCH: Calm. Appropriate affect, insight, judgment.             Signed Electronically by: Jackson Green MD

## 2024-07-25 NOTE — PROGRESS NOTES
Preceptor Attestation:   Patient seen, evaluated and discussed with the resident. I have verified the content of the note, which accurately reflects my assessment of the patient and the plan of care.    Supervising Physician:Alejandrina Fine MD    [facility]

## 2024-07-30 ENCOUNTER — TELEPHONE (OUTPATIENT)
Dept: GASTROENTEROLOGY | Facility: CLINIC | Age: 74
End: 2024-07-30
Payer: MEDICARE

## 2024-07-30 NOTE — TELEPHONE ENCOUNTER
"Endoscopy Scheduling Screen    Have you had a positive Covid test in the last 14 days?  No    What is your communication preference for Instructions and/or Bowel Prep?   MyChart    What insurance is in the chart?  Other:  MEDICARE    Ordering/Referring Provider: PAULINA WELLS   (If ordering provider performs procedure, schedule with ordering provider unless otherwise instructed. )    BMI: Estimated body mass index is 30.79 kg/m  as calculated from the following:    Height as of 7/25/24: 1.54 m (5' 0.63\").    Weight as of 7/25/24: 73 kg (161 lb).     Sedation Ordered  moderate sedation.   If patient BMI > 50 do not schedule in [facility].    If patient BMI > 45 do not schedule at [facility].    Are you taking methadone or Suboxone?  No    Have you had difficulties, pain, or discomfort during past endoscopy procedures?  No    Are you taking any prescription medications for pain 3 or more times per week?   NO, No RN review required.    Do you have a history of malignant hyperthermia?  No     Have you been diagnosed or told you have pulmonary hypertension?   No    Do you have an LVAD?  No    Have you been told you have moderate to severe sleep apnea?  No    Have you been told you have COPD, asthma, or any other lung disease?  Yes     What breathing problems do you have?  Asthma     Do you use home oxygen?  No    Have your breathing problems required an ED visit or hospitalization in the last year?  No    Do you have any heart conditions?  No     Have you ever had or are you waiting for an organ transplant?  No. Continue scheduling, no site restrictions.    Have you had a stroke or transient ischemic attack (TIA aka \"mini stroke\" in the last 6 months?   No    Have you been diagnosed with or been told you have cirrhosis of the liver?   No    Are you currently on dialysis?   No    Do you need assistance transferring?   No    BMI: Estimated body mass index is 30.79 kg/m  as calculated from the following:    Height as of " "7/25/24: 1.54 m (5' 0.63\").    Weight as of 7/25/24: 73 kg (161 lb).     Is patients BMI > 40 and scheduling location [facility]?  No    Do you take an injectable medication for weight loss or diabetes (excluding insulin)?  No    Do you take the medication Naltrexone?  No    Do you take blood thinners?  No       Prep   Are you currently on dialysis or do you have chronic kidney disease?  No    Do you have a diagnosis of diabetes?  No    Do you have a diagnosis of cystic fibrosis (CF)?  No    On a regular basis do you go 3 -5 days between bowel movements?      BMI > 40?  No    Preferred Pharmacy:  [id number] - [place], [place] - [address]  [address]  [facility] [address]  Phone: [phone] Fax: [phone]      Final Scheduling Details     Procedure scheduled  Upper endoscopy (EGD)    Surgeon:  DES     Date of procedure:  8/19/2024     Pre-OP / PAC:   No - Not required for this site.    [place]  [facility] [place]    [place]   [place]  [facility] [place]      Patient Reminders:   You will receive a call from a Nurse to review instructions and health history.  This assessment must be completed prior to your procedure.  Failure to complete the Nurse assessment may result in the procedure being cancelled.      On the day of your procedure, please designate an adult(s) who can drive you home stay with you for the next 24 hours. The medicines used in the exam will make you sleepy. You will not be able to drive.      You cannot take public transportation, ride share services, or non-medical taxi service without a responsible caregiver.  Medical transport services are allowed with the requirement that a responsible caregiver will receive you at your destination.  We require that drivers and caregivers are confirmed prior to your procedure.  "

## 2024-08-10 ENCOUNTER — APPOINTMENT (OUTPATIENT)
Dept: CT IMAGING | Facility: HOSPITAL | Age: 74
End: 2024-08-10
Payer: MEDICARE

## 2024-08-10 ENCOUNTER — HOSPITAL ENCOUNTER (EMERGENCY)
Facility: HOSPITAL | Age: 74
Discharge: HOME | End: 2024-08-11
Attending: EMERGENCY MEDICINE | Admitting: EMERGENCY MEDICINE
Payer: MEDICARE

## 2024-08-10 DIAGNOSIS — K27.9 PEPTIC ULCER DISEASE: ICD-10-CM

## 2024-08-10 LAB
ALBUMIN SERPL BCG-MCNC: 4 G/DL (ref 3.5–5.2)
ALBUMIN UR-MCNC: NEGATIVE MG/DL
ALP SERPL-CCNC: 82 U/L (ref 40–150)
ALT SERPL W P-5'-P-CCNC: 48 U/L (ref 0–70)
ANION GAP SERPL CALCULATED.3IONS-SCNC: 11 MMOL/L (ref 7–15)
APPEARANCE UR: CLEAR
AST SERPL W P-5'-P-CCNC: 37 U/L (ref 0–45)
BACTERIA #/AREA URNS HPF: ABNORMAL /HPF
BASOPHILS # BLD AUTO: 0 10E3/UL (ref 0–0.2)
BASOPHILS NFR BLD AUTO: 1 %
BILIRUB DIRECT SERPL-MCNC: <0.2 MG/DL (ref 0–0.3)
BILIRUB SERPL-MCNC: 0.2 MG/DL
BILIRUB UR QL STRIP: NEGATIVE
BUN SERPL-MCNC: 22 MG/DL (ref 8–23)
CALCIUM SERPL-MCNC: 9.2 MG/DL (ref 8.8–10.4)
CHLORIDE SERPL-SCNC: 102 MMOL/L (ref 98–107)
COLOR UR AUTO: COLORLESS
CREAT SERPL-MCNC: 1.37 MG/DL (ref 0.67–1.17)
EGFRCR SERPLBLD CKD-EPI 2021: 55 ML/MIN/1.73M2
EOSINOPHIL # BLD AUTO: 0.2 10E3/UL (ref 0–0.7)
EOSINOPHIL NFR BLD AUTO: 3 %
ERYTHROCYTE [DISTWIDTH] IN BLOOD BY AUTOMATED COUNT: 13 % (ref 10–15)
GLUCOSE SERPL-MCNC: 162 MG/DL (ref 70–99)
GLUCOSE UR STRIP-MCNC: NEGATIVE MG/DL
HCO3 SERPL-SCNC: 23 MMOL/L (ref 22–29)
HCT VFR BLD AUTO: 39.5 % (ref 40–53)
HGB BLD-MCNC: 13.4 G/DL (ref 13.3–17.7)
HGB UR QL STRIP: NEGATIVE
IMM GRANULOCYTES # BLD: 0 10E3/UL
IMM GRANULOCYTES NFR BLD: 0 %
KETONES UR STRIP-MCNC: NEGATIVE MG/DL
LEUKOCYTE ESTERASE UR QL STRIP: NEGATIVE
LIPASE SERPL-CCNC: 34 U/L (ref 13–60)
LYMPHOCYTES # BLD AUTO: 1.7 10E3/UL (ref 0.8–5.3)
LYMPHOCYTES NFR BLD AUTO: 27 %
MCH RBC QN AUTO: 30.6 PG (ref 26.5–33)
MCHC RBC AUTO-ENTMCNC: 33.9 G/DL (ref 31.5–36.5)
MCV RBC AUTO: 90 FL (ref 78–100)
MONOCYTES # BLD AUTO: 0.5 10E3/UL (ref 0–1.3)
MONOCYTES NFR BLD AUTO: 7 %
NEUTROPHILS # BLD AUTO: 3.9 10E3/UL (ref 1.6–8.3)
NEUTROPHILS NFR BLD AUTO: 62 %
NITRATE UR QL: NEGATIVE
NRBC # BLD AUTO: 0 10E3/UL
NRBC BLD AUTO-RTO: 0 /100
PH UR STRIP: 5.5 [PH] (ref 5–7)
PLATELET # BLD AUTO: 235 10E3/UL (ref 150–450)
POTASSIUM SERPL-SCNC: 4.5 MMOL/L (ref 3.4–5.3)
PROT SERPL-MCNC: 7.1 G/DL (ref 6.4–8.3)
RBC # BLD AUTO: 4.38 10E6/UL (ref 4.4–5.9)
RBC URINE: 0 /HPF
SODIUM SERPL-SCNC: 136 MMOL/L (ref 135–145)
SP GR UR STRIP: 1.01 (ref 1–1.03)
UROBILINOGEN UR STRIP-MCNC: <2 MG/DL
WBC # BLD AUTO: 6.4 10E3/UL (ref 4–11)
WBC URINE: <1 /HPF

## 2024-08-10 PROCEDURE — 82248 BILIRUBIN DIRECT: CPT | Performed by: EMERGENCY MEDICINE

## 2024-08-10 PROCEDURE — 83690 ASSAY OF LIPASE: CPT | Performed by: EMERGENCY MEDICINE

## 2024-08-10 PROCEDURE — 83690 ASSAY OF LIPASE: CPT | Performed by: STUDENT IN AN ORGANIZED HEALTH CARE EDUCATION/TRAINING PROGRAM

## 2024-08-10 PROCEDURE — 250N000011 HC RX IP 250 OP 636

## 2024-08-10 PROCEDURE — 93005 ELECTROCARDIOGRAM TRACING: CPT

## 2024-08-10 PROCEDURE — 85025 COMPLETE CBC W/AUTO DIFF WBC: CPT | Performed by: STUDENT IN AN ORGANIZED HEALTH CARE EDUCATION/TRAINING PROGRAM

## 2024-08-10 PROCEDURE — 81001 URINALYSIS AUTO W/SCOPE: CPT

## 2024-08-10 PROCEDURE — 74177 CT ABD & PELVIS W/CONTRAST: CPT | Mod: MG

## 2024-08-10 PROCEDURE — 82248 BILIRUBIN DIRECT: CPT | Performed by: STUDENT IN AN ORGANIZED HEALTH CARE EDUCATION/TRAINING PROGRAM

## 2024-08-10 PROCEDURE — 85025 COMPLETE CBC W/AUTO DIFF WBC: CPT | Performed by: EMERGENCY MEDICINE

## 2024-08-10 PROCEDURE — 99285 EMERGENCY DEPT VISIT HI MDM: CPT | Mod: 25

## 2024-08-10 PROCEDURE — 36415 COLL VENOUS BLD VENIPUNCTURE: CPT | Performed by: STUDENT IN AN ORGANIZED HEALTH CARE EDUCATION/TRAINING PROGRAM

## 2024-08-10 PROCEDURE — 80053 COMPREHEN METABOLIC PANEL: CPT | Performed by: EMERGENCY MEDICINE

## 2024-08-10 PROCEDURE — 82040 ASSAY OF SERUM ALBUMIN: CPT | Performed by: STUDENT IN AN ORGANIZED HEALTH CARE EDUCATION/TRAINING PROGRAM

## 2024-08-10 RX ORDER — IOPAMIDOL 755 MG/ML
80 INJECTION, SOLUTION INTRAVASCULAR ONCE
Status: COMPLETED | OUTPATIENT
Start: 2024-08-11 | End: 2024-08-10

## 2024-08-10 RX ADMIN — IOPAMIDOL 80 ML: 755 INJECTION, SOLUTION INTRAVENOUS at 23:39

## 2024-08-10 ASSESSMENT — ACTIVITIES OF DAILY LIVING (ADL)
ADLS_ACUITY_SCORE: 33
ADLS_ACUITY_SCORE: 35

## 2024-08-10 ASSESSMENT — COLUMBIA-SUICIDE SEVERITY RATING SCALE - C-SSRS
2. HAVE YOU ACTUALLY HAD ANY THOUGHTS OF KILLING YOURSELF IN THE PAST MONTH?: NO
6. HAVE YOU EVER DONE ANYTHING, STARTED TO DO ANYTHING, OR PREPARED TO DO ANYTHING TO END YOUR LIFE?: NO
1. IN THE PAST MONTH, HAVE YOU WISHED YOU WERE DEAD OR WISHED YOU COULD GO TO SLEEP AND NOT WAKE UP?: NO

## 2024-08-11 VITALS
DIASTOLIC BLOOD PRESSURE: 76 MMHG | TEMPERATURE: 97.4 F | WEIGHT: 165.12 LBS | SYSTOLIC BLOOD PRESSURE: 154 MMHG | HEIGHT: 60 IN | HEART RATE: 55 BPM | RESPIRATION RATE: 27 BRPM | OXYGEN SATURATION: 98 % | BODY MASS INDEX: 32.42 KG/M2

## 2024-08-11 PROCEDURE — 250N000013 HC RX MED GY IP 250 OP 250 PS 637: Performed by: EMERGENCY MEDICINE

## 2024-08-11 PROCEDURE — 96374 THER/PROPH/DIAG INJ IV PUSH: CPT | Mod: 59

## 2024-08-11 PROCEDURE — 250N000011 HC RX IP 250 OP 636: Performed by: EMERGENCY MEDICINE

## 2024-08-11 RX ORDER — FAMOTIDINE 20 MG/1
20 TABLET, FILM COATED ORAL 2 TIMES DAILY PRN
Qty: 30 TABLET | Refills: 0 | Status: SHIPPED | OUTPATIENT
Start: 2024-08-11

## 2024-08-11 RX ORDER — SUCRALFATE 1 G/1
1 TABLET ORAL
Qty: 56 TABLET | Refills: 0 | Status: SHIPPED | OUTPATIENT
Start: 2024-08-11 | End: 2024-08-25

## 2024-08-11 RX ORDER — SUCRALFATE 1 G/1
1 TABLET ORAL ONCE
Status: COMPLETED | OUTPATIENT
Start: 2024-08-11 | End: 2024-08-11

## 2024-08-11 RX ORDER — MAGNESIUM HYDROXIDE/ALUMINUM HYDROXICE/SIMETHICONE 120; 1200; 1200 MG/30ML; MG/30ML; MG/30ML
15 SUSPENSION ORAL ONCE
Status: COMPLETED | OUTPATIENT
Start: 2024-08-11 | End: 2024-08-11

## 2024-08-11 RX ADMIN — SUCRALFATE 1 G: 1 TABLET ORAL at 00:48

## 2024-08-11 RX ADMIN — FAMOTIDINE 20 MG: 10 INJECTION, SOLUTION INTRAVENOUS at 00:51

## 2024-08-11 RX ADMIN — ALUMINUM HYDROXIDE, MAGNESIUM HYDROXIDE, AND SIMETHICONE 15 ML: 200; 200; 20 SUSPENSION ORAL at 00:50

## 2024-08-11 ASSESSMENT — ACTIVITIES OF DAILY LIVING (ADL)
ADLS_ACUITY_SCORE: 35
ADLS_ACUITY_SCORE: 35

## 2024-08-11 NOTE — ED PROVIDER NOTES
EMERGENCY DEPARTMENT ENCOUNTER      NAME: Cher Rae  AGE: 74 year old male  YOB: 1950  MRN: [id number]  EVALUATION DATE & TIME: 8/10/2024 10:01 PM    PCP: George Saenz    ED PROVIDER: Michelle Cristina PA-C      Chief Complaint   Patient presents with    Abdominal Pain       FINAL IMPRESSION:  1. Peptic ulcer disease      ED COURSE & MEDICAL DECISION MAKING:    Pertinent Labs & Imaging studies reviewed. (See chart for details)  74 year old male presents to the Emergency Department for evaluation of abdominal pain.  For the past several weeks patient has had right upper quadrant and left upper quadrant abdominal pain.  Patient reports that the abdominal pain radiates to both flanks.  Patient was seen by his primary care doctor on 7/25 after having several bloody stools.  Hemoglobin was stable at that time.  Patient returns to the emergency room today after having worsening abdominal pain.  Patient reports that the pain worsens after eating.  No bloody stools.  Vital signs reviewed patient is hypertensive most likely secondary due to pain.  Afebrile.  On exam patient is minimally tender to palpation bilateral upper quadrants.  No rash.  Cardiac and pulm exams unremarkable.    Differential diagnosis includes gastritis, colitis, diverticulitis, hepatobiliary etiology, appendicitis, pancreatitis.  CBC shows a white blood cell count of 6.4.  Hemoglobin is 13.4.  Sodium and potassium is within normal limits.  Anion gap is 11.  Creatinine is 1.37.  GFR is 55.  Lipase and bilirubin direct is within normal limits.  UA did not show any nitrates or leukocytes.  CT of the abdomen shows no acute findings or inflammatory changes in the abdomen or pelvis.  Patient will be signed out to Dr. Eldridge pending disposition.      ED COURSE:   10:20 PM I saw the patient.   11:00 PM Staffed with Dr. Eldridge.   12:41 AM Signed out to Dr. Eldridge.        At the conclusion of the encounter I discussed the results of all of the tests  and the disposition. The questions were answered. The patient or family acknowledged understanding and was agreeable with the care plan.     0 minutes of critical care time       Medical Decision Making  Obtained supplemental history:Supplemental history obtained?: Documented in chart and Family Member/Significant Other  Reviewed external records: External records reviewed?: Documented in chart and Other: 7/25/2024  Care impacted by chronic illness:N/A  Care significantly affected by social determinants of health:N/A  Did you consider but not order tests?: Work up considered but not performed and documented in chart, if applicable  Did you interpret images independently?: Independent interpretation of ECG and images noted in documentation, when applicable.  Consultation discussion with other provider:Did you involve another provider (consultant, , pharmacy, etc.)?: I discussed the care with another health care provider, see documentation for details.  Signed out      MEDICATIONS GIVEN IN THE EMERGENCY:  Medications   iopamidol (ISOVUE-370) solution 80 mL (80 mLs Intravenous $Given 8/10/24 8654)   famotidine (PEPCID) injection 20 mg (20 mg Intravenous $Given 8/11/24 0051)   sucralfate (CARAFATE) tablet 1 g (1 g Oral $Given 8/11/24 0048)   alum & mag hydroxide-simethicone (MAALOX) suspension 15 mL (15 mLs Oral $Given 8/11/24 0050)       NEW PRESCRIPTIONS STARTED AT TODAY'S ER VISIT  Discharge Medication List as of 8/11/2024  2:16 AM        START taking these medications    Details   famotidine (PEPCID) 20 MG tablet Take 1 tablet (20 mg) by mouth 2 times daily as needed (upper abdominal pain), Disp-30 tablet, R-0, E-Prescribe      sucralfate (CARAFATE) 1 GM tablet Take 1 tablet (1 g) by mouth 4 times daily (before meals and nightly) for 14 days, Disp-56 tablet, R-0, E-Prescribe           =================================================================    HPI    Patient information was obtained from: Patient and  patient's daughter.    Use of : Yes (In Person) - Language Cem Rae is a 74 year old male with a pertinent history of anemia, cerebral artery occlusion with cerebral infarction, chronic GERD, hypertension, and hyperlipidemia who presents to this ED by independent means for evaluation of abdominal pain.    Patient reports 7/10 abdominal pain that has been constant for several weeks. However, tonight the pain has significantly worsened, this is not a new pain. Patient states he feels bloated and the pain is is diffuse across abdomen but more centrally located to the LUQ. The pain radiates to his back and endorses tightness to bilateral shoulders, but notes pain does not radiate to chest. He endorses poor appetite and lightheadedness. Pain improves with bowel movements and worsens with eating. Patient was recently seen by his PCP on 7/16/2024 and 7/25/2024 for abdominal pains and black tarry stools. No hematochezia or black tarry stools since. Patient's last bowel movement was prior to arrival. Patient denies shortness of breath, fever, chills, nausea, and vomiting.     Per patient's daughter, patient has an EEG scheduled on Monday (8/12/2024)    Per chart review, patient was seen on 7/25/2024 at [facility] [place] for evaluation of black stools for 1 week.  History of stomach ulcer induced by aspirin use in 2002.  Hemoglobin at this time is 13.  No imaging was done. Started on omeprazole daily.      REVIEW OF SYSTEMS   As per HPI    PAST MEDICAL HISTORY:  Past Medical History:   Diagnosis Date    Anemia     Cerebral artery occlusion with cerebral infarction (H)     No residual from stroke    Chronic Gout     Gastroesophageal reflux disease     Hypertension        PAST SURGICAL HISTORY:  Past Surgical History:   Procedure Laterality Date    COLONOSCOPY      COLONOSCOPY N/A 9/23/2021    Procedure: COLONOSCOPY;  Surgeon: George Saenz MD;  Location: [facility] OR            CURRENT MEDICATIONS:    famotidine (PEPCID) 20 MG tablet  sucralfate (CARAFATE) 1 GM tablet  acetaminophen (TYLENOL) 500 MG tablet  acetaminophen (TYLENOL) 500 MG tablet  allopurinol (ZYLOPRIM) 100 MG tablet  atorvastatin (LIPITOR) 40 MG tablet  azelastine (OPTIVAR) 0.05 % ophthalmic solution  fluticasone (FLONASE) 50 MCG/ACT nasal spray  lisinopril (ZESTRIL) 10 MG tablet  omeprazole (PRILOSEC) 20 MG DR capsule  polyethylene glycol (MIRALAX) 17 GM/Dose powder        ALLERGIES:  No Known Allergies    FAMILY HISTORY:  Family History   Problem Relation Age of Onset    Cancer No family hx of     Diabetes No family hx of     Coronary Artery Disease No family hx of     Heart Disease No family hx of     Hypertension No family hx of        SOCIAL HISTORY:   Social History     Socioeconomic History    Marital status:    Tobacco Use    Smoking status: Never     Passive exposure: Never    Smokeless tobacco: Never   Vaping Use    Vaping status: Never Used   Substance and Sexual Activity    Alcohol use: Not Currently    Drug use: Not Currently   Social History Narrative    Has been back forth from [place] and [place] across the years. He originally immigrated to [place]. Has been back in [place] since summer 2020.        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        He really likes to write. Writes lots of things about [facility] history.        Has 11 children (one ) and 15 grandchildren (9 in [place]).        Retired.        Likes to play the flute. Gardens [facility] herbs and cucumbers especially.     Social Determinants of Health     Interpersonal Safety: Low Risk  (2024)    Interpersonal Safety     Do you feel physically and emotionally safe where you currently live?: Yes     Within the past 12 months, have you been hit, slapped, kicked or otherwise physically hurt by someone?: No     Within the past 12 months, have you been humiliated or emotionally abused in other ways by your  "partner or ex-partner?: No       VITALS:  BP (!) 154/76   Pulse 55   Temp 97.4  F (36.3  C)   Resp 27   Ht 1.499 m (4' 11\")   Wt 74.9 kg (165 lb 2 oz)   SpO2 98%   BMI 33.35 kg/m      PHYSICAL EXAM    Physical Exam  Vitals and nursing note reviewed.   Constitutional:       Appearance: Normal appearance.   HENT:      Head: Atraumatic.      Right Ear: External ear normal.      Left Ear: External ear normal.      Nose: Nose normal.      Mouth/Throat:      Mouth: Mucous membranes are moist.   Eyes:      Conjunctiva/sclera: Conjunctivae normal.      Pupils: Pupils are equal, round, and reactive to light.   Cardiovascular:      Rate and Rhythm: Normal rate and regular rhythm.      Pulses: Normal pulses.      Heart sounds: Normal heart sounds. No murmur heard.     No friction rub. No gallop.   Pulmonary:      Effort: Pulmonary effort is normal.      Breath sounds: Normal breath sounds. No wheezing or rales.   Abdominal:      Tenderness: There is abdominal tenderness in the right upper quadrant, epigastric area and left upper quadrant. There is no guarding or rebound.   Musculoskeletal:      Cervical back: Normal range of motion.   Skin:     General: Skin is dry.   Neurological:      Mental Status: He is alert. Mental status is at baseline.   Psychiatric:         Mood and Affect: Mood normal.         Thought Content: Thought content normal.          LAB:  All pertinent labs reviewed and interpreted.  Labs Ordered and Resulted from Time of ED Arrival to Time of ED Departure   COMPREHENSIVE METABOLIC PANEL - Abnormal       Result Value    Sodium 136      Potassium 4.5      Carbon Dioxide (CO2) 23      Anion Gap 11      Urea Nitrogen 22.0      Creatinine 1.37 (*)     GFR Estimate 55 (*)     Calcium 9.2      Chloride 102      Glucose 162 (*)     Alkaline Phosphatase 82      AST 37      ALT 48      Protein Total 7.1      Albumin 4.0      Bilirubin Total 0.2     CBC WITH PLATELETS AND DIFFERENTIAL - Abnormal    WBC Count " 6.4      RBC Count 4.38 (*)     Hemoglobin 13.4      Hematocrit 39.5 (*)     MCV 90      MCH 30.6      MCHC 33.9      RDW 13.0      Platelet Count 235      % Neutrophils 62      % Lymphocytes 27      % Monocytes 7      % Eosinophils 3      % Basophils 1      % Immature Granulocytes 0      NRBCs per 100 WBC 0      Absolute Neutrophils 3.9      Absolute Lymphocytes 1.7      Absolute Monocytes 0.5      Absolute Eosinophils 0.2      Absolute Basophils 0.0      Absolute Immature Granulocytes 0.0      Absolute NRBCs 0.0     ROUTINE UA WITH MICROSCOPIC REFLEX TO CULTURE - Abnormal    Color Urine Colorless      Appearance Urine Clear      Glucose Urine Negative      Bilirubin Urine Negative      Ketones Urine Negative      Specific Gravity Urine 1.013      Blood Urine Negative      pH Urine 5.5      Protein Albumin Urine Negative      Urobilinogen Urine <2.0      Nitrite Urine Negative      Leukocyte Esterase Urine Negative      Bacteria Urine Few (*)     RBC Urine 0      WBC Urine <1     LIPASE - Normal    Lipase 34     BILIRUBIN DIRECT - Normal    Bilirubin Direct <0.20          RADIOLOGY:  Reviewed all pertinent imaging. Please see official radiology report.  CT Abdomen Pelvis w Contrast   Final Result   IMPRESSION:    1.  No acute findings or inflammatory changes in the abdomen or pelvis.          EKG:    Performed at: 23:49    Impression: Sinus bradycardia. Low voltage QRS. Cannot rule out anterior infarct, age undetermined.     Rate: 57 bpm  Rhythm: Sinus bradycardia.  Axis: 30 -2 41  WA Interval: 206 ms  QRS Interval: 84 ms  QTc Interval: 399 ms  ST Changes: None reported.   Comparison: No previous ECGs available.     I have independently reviewed and interpreted the EKG(s) documented above.      I, Gabrielle Robles, am serving as a scribe to document services personally performed by Michelle Cristina PA-C, based on my observation and the provider's statements to me. I, Michelle Cristina PA-C, attest that Gabrielle  Travis is acting in a scribe capacity, has observed my performance of the services and has documented them in accordance with my direction.    Michelle Cristina PA-C  [facility] EMERGENCY DEPARTMENT  [address] [place] [id number] [phone]       Michelle Cristina PA-C  08/11/24 3254

## 2024-08-11 NOTE — H&P (VIEW-ONLY)
Emergency Department Midlevel Supervisory Note     I had a face to face encounter with this patient seen by the Advanced Practice Provider (SAMY). I personally made/approved the management plan and take responsibility for the patient management. I personally saw patient and performed a substantive portion of the visit including all aspects of the medical decision making.     MDM  1. Peptic ulcer disease          ED Course:  74-year-old male presented to the ED for evaluation of upper abdominal pain and bloating that has been ongoing for the last month and is noticeably worse whenever he eats.  The patient was slightly hypertensive upon arrival.  He was otherwise hemodynamic stable.  The patient did not appear to be in any obvious distress or discomfort at the time of his initial evaluation.  On exam the patient was noted to have mild tenderness to palpation noted in the epigastric region and left upper quadrant.  He did not have evidence of an acute abdomen, however.    CBC, CMP, lipase, and UA were all reassuring.    CT scan of the abdomen was nondiagnostic.    EKG revealed normal sinus rhythm without any concerning ST or T wave changes.     The patient was reevaluated and both he and his family was were informed of the reassuring lab and abdominal CT scan results.    Based upon his symptoms the patient and family was were informed that his symptoms likely represent peptic ulcer disease.  Patient states that his pain was still a 5 or 6 out of 10.  The patient was given a GI cocktail, IV famotidine, and oral Carafate here in the ED.    The patient was reevaluated approximately 1 hour after receiving the medications.  The patient reported complete resolution of his epigastric pain after receiving the medications.  The patient was then instructed to double his dose of omeprazole.  He was sent home with famotidine to take twice a day as well as Carafate to use 1 hour prior to meals and bedtime.  He was instructed to  "follow-up with his primary care provider and/or gastroenterologist for his scheduled EGD.  The patient was instructed to return back to ED sooner for any worsening pain or any other new or concerning symptoms.    23:50  Michelle Cristina PA-C staffed patient with me. I agree with their assessment and plan of management, and I will see the patient.  00:30  I met with the patient to introduce myself, gather additional history, perform my initial exam, and discuss the plan.     Brief HPI:     Cher Rae is a 74 year old male who presents for evaluation of epigastric/left upper quadrant abdominal pain.  The patient stated that the pain started approximate 1 month ago.  Patient notes that the pain is worsened with eating and typically feels better when he does not eat anything.  The patient also states that he feels bloated.  Patient was initially seen and started on omeprazole.  Patient noted that his dark-colored stools solved after starting the omeprazole, but his abdominal discomfort has persisted.  Patient also notes that the pain occurs at night while he is trying to sleep.  The patient denies any chest pain or shortness of breath.  Of note, the patient is scheduled to undergo an EGD on the 8/19/2024.      Brief Physical Exam: BP (!) 154/76   Pulse 55   Temp 97.4  F (36.3  C)   Resp 27   Ht 1.499 m (4' 11\")   Wt 74.9 kg (165 lb 2 oz)   SpO2 98%   BMI 33.35 kg/m    Constitutional:  Alert, in no acute distress  EYES: Conjunctivae clear  HENT:  Atraumatic  Respiratory:  Respirations even, unlabored, in no acute respiratory distress  Cardiovascular:  Regular rate and rhythm, good peripheral perfusion  GI: Soft.  Mild tenderness palpation noted in the epigastric region and left upper quadrant.  No rigidity, guarding, or rebound tenderness to palpation.    Musculoskeletal:  Moves all 4 extremities equally, grossly symmetrical strength  Integument: Warm & dry. No appreciable rash, erythema.  Neurologic:  Alert & " oriented, speech clear and fluent, no focal deficits noted  Psych: Normal mood and affect      Labs and Imaging:  Results for orders placed or performed during the hospital encounter of 08/10/24   CT Abdomen Pelvis w Contrast    Impression    IMPRESSION:   1.  No acute findings or inflammatory changes in the abdomen or pelvis.   Comprehensive metabolic panel   Result Value Ref Range    Sodium 136 135 - 145 mmol/L    Potassium 4.5 3.4 - 5.3 mmol/L    Carbon Dioxide (CO2) 23 22 - 29 mmol/L    Anion Gap 11 7 - 15 mmol/L    Urea Nitrogen 22.0 8.0 - 23.0 mg/dL    Creatinine 1.37 (H) 0.67 - 1.17 mg/dL    GFR Estimate 55 (L) >60 mL/min/1.73m2    Calcium 9.2 8.8 - 10.4 mg/dL    Chloride 102 98 - 107 mmol/L    Glucose 162 (H) 70 - 99 mg/dL    Alkaline Phosphatase 82 40 - 150 U/L    AST 37 0 - 45 U/L    ALT 48 0 - 70 U/L    Protein Total 7.1 6.4 - 8.3 g/dL    Albumin 4.0 3.5 - 5.2 g/dL    Bilirubin Total 0.2 <=1.2 mg/dL   Result Value Ref Range    Lipase 34 13 - 60 U/L   Result Value Ref Range    Bilirubin Direct <0.20 0.00 - 0.30 mg/dL   CBC with platelets and differential   Result Value Ref Range    WBC Count 6.4 4.0 - 11.0 10e3/uL    RBC Count 4.38 (L) 4.40 - 5.90 10e6/uL    Hemoglobin 13.4 13.3 - 17.7 g/dL    Hematocrit 39.5 (L) 40.0 - 53.0 %    MCV 90 78 - 100 fL    MCH 30.6 26.5 - 33.0 pg    MCHC 33.9 31.5 - 36.5 g/dL    RDW 13.0 10.0 - 15.0 %    Platelet Count 235 150 - 450 10e3/uL    % Neutrophils 62 %    % Lymphocytes 27 %    % Monocytes 7 %    % Eosinophils 3 %    % Basophils 1 %    % Immature Granulocytes 0 %    NRBCs per 100 WBC 0 <1 /100    Absolute Neutrophils 3.9 1.6 - 8.3 10e3/uL    Absolute Lymphocytes 1.7 0.8 - 5.3 10e3/uL    Absolute Monocytes 0.5 0.0 - 1.3 10e3/uL    Absolute Eosinophils 0.2 0.0 - 0.7 10e3/uL    Absolute Basophils 0.0 0.0 - 0.2 10e3/uL    Absolute Immature Granulocytes 0.0 <=0.4 10e3/uL    Absolute NRBCs 0.0 10e3/uL   UA with Microscopic reflex to Culture    Specimen: Urine, Clean Catch    Result Value Ref Range    Color Urine Colorless Colorless, Straw, Light Yellow, Yellow    Appearance Urine Clear Clear    Glucose Urine Negative Negative mg/dL    Bilirubin Urine Negative Negative    Ketones Urine Negative Negative mg/dL    Specific Gravity Urine 1.013 1.001 - 1.030    Blood Urine Negative Negative    pH Urine 5.5 5.0 - 7.0    Protein Albumin Urine Negative Negative mg/dL    Urobilinogen Urine <2.0 <2.0 mg/dL    Nitrite Urine Negative Negative    Leukocyte Esterase Urine Negative Negative    Bacteria Urine Few (A) None Seen /HPF    RBC Urine 0 <=2 /HPF    WBC Urine <1 <=5 /HPF       I have reviewed the relevant laboratory studies above.    I independently interpreted the following imaging study(s):     EKG:   Sinus bradycardia.  Rate of 57.  First-degree AV block.  Normal QRS.  Normal QT.  No ST or T wave changes.  No previous EKG available for comparison.    I reviewed and independently interpreted the patient's EKG, with comments made as listed below. Please see scanned EKG for full report.       Geoffrey Eldridge DO  [facility] EMERGENCY DEPARTMENT  [address] [place] [id number] [phone]       Geoffrey Eldridge DO  08/11/24 0347

## 2024-08-11 NOTE — ED TRIAGE NOTES
Patient has been struggling with stomach pains and was seen by his doctor on July 16th. Patient has an appointment for October but can't wait any longer due to the pain. NO nausea/vomiting or diarrhea. Pain is worst after eating.      Triage Assessment (Adult)       Row Name 08/10/24 2007          Triage Assessment    Airway WDL WDL        Respiratory WDL    Respiratory WDL WDL        Skin Circulation/Temperature WDL    Skin Circulation/Temperature WDL WDL        Cardiac WDL    Cardiac WDL WDL        Peripheral/Neurovascular WDL    Peripheral Neurovascular WDL WDL        Cognitive/Neuro/Behavioral WDL    Cognitive/Neuro/Behavioral WDL WDL

## 2024-08-11 NOTE — DISCHARGE INSTRUCTIONS
All of the laboratory tests and abdominal CT scan results appear reassuring.  The suspected cause of your abdominal pain is peptic ulcer disease.      It is recommended that you take the omeprazole twice a day going forward.  Use the prescribed famotidine twice a day as needed for any upper abdominal pain secondary to the peptic ulcer disease.  Take the prescribed Carafate 1 hour prior to meals and bedtime for the next 2 weeks.      Follow-up with your primary care provider and/or gastroenterologist for your scheduled upper endoscopy.  You should also discuss H. pylori testing at that time as well.  Return back to ED sooner for any worsening pain or any other new or concerning symptoms.

## 2024-08-11 NOTE — ED PROVIDER NOTES
Emergency Department Midlevel Supervisory Note     I had a face to face encounter with this patient seen by the Advanced Practice Provider (SAMY). I personally made/approved the management plan and take responsibility for the patient management. I personally saw patient and performed a substantive portion of the visit including all aspects of the medical decision making.     MDM  1. Peptic ulcer disease          ED Course:  74-year-old male presented to the ED for evaluation of upper abdominal pain and bloating that has been ongoing for the last month and is noticeably worse whenever he eats.  The patient was slightly hypertensive upon arrival.  He was otherwise hemodynamic stable.  The patient did not appear to be in any obvious distress or discomfort at the time of his initial evaluation.  On exam the patient was noted to have mild tenderness to palpation noted in the epigastric region and left upper quadrant.  He did not have evidence of an acute abdomen, however.    CBC, CMP, lipase, and UA were all reassuring.    CT scan of the abdomen was nondiagnostic.    EKG revealed normal sinus rhythm without any concerning ST or T wave changes.     The patient was reevaluated and both he and his family was were informed of the reassuring lab and abdominal CT scan results.    Based upon his symptoms the patient and family was were informed that his symptoms likely represent peptic ulcer disease.  Patient states that his pain was still a 5 or 6 out of 10.  The patient was given a GI cocktail, IV famotidine, and oral Carafate here in the ED.    The patient was reevaluated approximately 1 hour after receiving the medications.  The patient reported complete resolution of his epigastric pain after receiving the medications.  The patient was then instructed to double his dose of omeprazole.  He was sent home with famotidine to take twice a day as well as Carafate to use 1 hour prior to meals and bedtime.  He was instructed to  "follow-up with his primary care provider and/or gastroenterologist for his scheduled EGD.  The patient was instructed to return back to ED sooner for any worsening pain or any other new or concerning symptoms.    23:50  Michelle Cristina PA-C staffed patient with me. I agree with their assessment and plan of management, and I will see the patient.  00:30  I met with the patient to introduce myself, gather additional history, perform my initial exam, and discuss the plan.     Brief HPI:     Cher Rae is a 74 year old male who presents for evaluation of epigastric/left upper quadrant abdominal pain.  The patient stated that the pain started approximate 1 month ago.  Patient notes that the pain is worsened with eating and typically feels better when he does not eat anything.  The patient also states that he feels bloated.  Patient was initially seen and started on omeprazole.  Patient noted that his dark-colored stools solved after starting the omeprazole, but his abdominal discomfort has persisted.  Patient also notes that the pain occurs at night while he is trying to sleep.  The patient denies any chest pain or shortness of breath.  Of note, the patient is scheduled to undergo an EGD on the 8/19/2024.      Brief Physical Exam: BP (!) 154/76   Pulse 55   Temp 97.4  F (36.3  C)   Resp 27   Ht 1.499 m (4' 11\")   Wt 74.9 kg (165 lb 2 oz)   SpO2 98%   BMI 33.35 kg/m    Constitutional:  Alert, in no acute distress  EYES: Conjunctivae clear  HENT:  Atraumatic  Respiratory:  Respirations even, unlabored, in no acute respiratory distress  Cardiovascular:  Regular rate and rhythm, good peripheral perfusion  GI: Soft.  Mild tenderness palpation noted in the epigastric region and left upper quadrant.  No rigidity, guarding, or rebound tenderness to palpation.    Musculoskeletal:  Moves all 4 extremities equally, grossly symmetrical strength  Integument: Warm & dry. No appreciable rash, erythema.  Neurologic:  Alert & " oriented, speech clear and fluent, no focal deficits noted  Psych: Normal mood and affect      Labs and Imaging:  Results for orders placed or performed during the hospital encounter of 08/10/24   CT Abdomen Pelvis w Contrast    Impression    IMPRESSION:   1.  No acute findings or inflammatory changes in the abdomen or pelvis.   Comprehensive metabolic panel   Result Value Ref Range    Sodium 136 135 - 145 mmol/L    Potassium 4.5 3.4 - 5.3 mmol/L    Carbon Dioxide (CO2) 23 22 - 29 mmol/L    Anion Gap 11 7 - 15 mmol/L    Urea Nitrogen 22.0 8.0 - 23.0 mg/dL    Creatinine 1.37 (H) 0.67 - 1.17 mg/dL    GFR Estimate 55 (L) >60 mL/min/1.73m2    Calcium 9.2 8.8 - 10.4 mg/dL    Chloride 102 98 - 107 mmol/L    Glucose 162 (H) 70 - 99 mg/dL    Alkaline Phosphatase 82 40 - 150 U/L    AST 37 0 - 45 U/L    ALT 48 0 - 70 U/L    Protein Total 7.1 6.4 - 8.3 g/dL    Albumin 4.0 3.5 - 5.2 g/dL    Bilirubin Total 0.2 <=1.2 mg/dL   Result Value Ref Range    Lipase 34 13 - 60 U/L   Result Value Ref Range    Bilirubin Direct <0.20 0.00 - 0.30 mg/dL   CBC with platelets and differential   Result Value Ref Range    WBC Count 6.4 4.0 - 11.0 10e3/uL    RBC Count 4.38 (L) 4.40 - 5.90 10e6/uL    Hemoglobin 13.4 13.3 - 17.7 g/dL    Hematocrit 39.5 (L) 40.0 - 53.0 %    MCV 90 78 - 100 fL    MCH 30.6 26.5 - 33.0 pg    MCHC 33.9 31.5 - 36.5 g/dL    RDW 13.0 10.0 - 15.0 %    Platelet Count 235 150 - 450 10e3/uL    % Neutrophils 62 %    % Lymphocytes 27 %    % Monocytes 7 %    % Eosinophils 3 %    % Basophils 1 %    % Immature Granulocytes 0 %    NRBCs per 100 WBC 0 <1 /100    Absolute Neutrophils 3.9 1.6 - 8.3 10e3/uL    Absolute Lymphocytes 1.7 0.8 - 5.3 10e3/uL    Absolute Monocytes 0.5 0.0 - 1.3 10e3/uL    Absolute Eosinophils 0.2 0.0 - 0.7 10e3/uL    Absolute Basophils 0.0 0.0 - 0.2 10e3/uL    Absolute Immature Granulocytes 0.0 <=0.4 10e3/uL    Absolute NRBCs 0.0 10e3/uL   UA with Microscopic reflex to Culture    Specimen: Urine, Clean Catch    Result Value Ref Range    Color Urine Colorless Colorless, Straw, Light Yellow, Yellow    Appearance Urine Clear Clear    Glucose Urine Negative Negative mg/dL    Bilirubin Urine Negative Negative    Ketones Urine Negative Negative mg/dL    Specific Gravity Urine 1.013 1.001 - 1.030    Blood Urine Negative Negative    pH Urine 5.5 5.0 - 7.0    Protein Albumin Urine Negative Negative mg/dL    Urobilinogen Urine <2.0 <2.0 mg/dL    Nitrite Urine Negative Negative    Leukocyte Esterase Urine Negative Negative    Bacteria Urine Few (A) None Seen /HPF    RBC Urine 0 <=2 /HPF    WBC Urine <1 <=5 /HPF       I have reviewed the relevant laboratory studies above.    I independently interpreted the following imaging study(s):     EKG:   Sinus bradycardia.  Rate of 57.  First-degree AV block.  Normal QRS.  Normal QT.  No ST or T wave changes.  No previous EKG available for comparison.    I reviewed and independently interpreted the patient's EKG, with comments made as listed below. Please see scanned EKG for full report.       Geoffrey Eldridge DO  [facility] EMERGENCY DEPARTMENT  [address] [place] [id number] [phone]       Geoffrey Eldridge DO  08/11/24 0347

## 2024-08-12 LAB
ATRIAL RATE - MUSE: 57 BPM
DIASTOLIC BLOOD PRESSURE - MUSE: 77 MMHG
INTERPRETATION ECG - MUSE: NORMAL
P AXIS - MUSE: 30 DEGREES
PR INTERVAL - MUSE: 206 MS
QRS DURATION - MUSE: 84 MS
QT - MUSE: 410 MS
QTC - MUSE: 399 MS
R AXIS - MUSE: -2 DEGREES
SYSTOLIC BLOOD PRESSURE - MUSE: 188 MMHG
T AXIS - MUSE: 41 DEGREES
VENTRICULAR RATE- MUSE: 57 BPM

## 2024-08-13 ENCOUNTER — PATIENT OUTREACH (OUTPATIENT)
Dept: CARE COORDINATION | Facility: CLINIC | Age: 74
End: 2024-08-13
Payer: MEDICARE

## 2024-08-13 NOTE — PROGRESS NOTES
Clinic Care Coordination Contact  Follow Up Progress Note      Assessment:  The pt was recently in the ED, I called to check up on the pt, and help the pt setup a ED follow up. The pt was at [facility] for abdominal pain. I called and talked to the pt, pt stated that he is doing better. Pt stated that he has a follow up on 08/20/2024 at 2:00pm with .    Care Gaps:    Health Maintenance Due   Topic Date Due    ADVANCE CARE PLANNING  Never done    RSV VACCINE (Pregnancy & 60+) (1 - 1-dose 60+ series) Never done    MEDICARE ANNUAL WELLNESS VISIT  09/28/2022    COVID-19 Vaccine (8 - 2023-24 season) 02/15/2024    ZOSTER IMMUNIZATION (2 of 2) 10/16/2023

## 2024-08-16 ENCOUNTER — TELEPHONE (OUTPATIENT)
Dept: GASTROENTEROLOGY | Facility: CLINIC | Age: 74
End: 2024-08-16
Payer: MEDICARE

## 2024-08-16 ENCOUNTER — ANESTHESIA EVENT (OUTPATIENT)
Dept: SURGERY | Facility: AMBULATORY SURGERY CENTER | Age: 74
End: 2024-08-16
Payer: MEDICARE

## 2024-08-16 NOTE — TELEPHONE ENCOUNTER
[employer] called to inform [facility] that an in-person ong  will not be available for the patient's procedure on 8/19/24.      is notifying RN line at [facility].

## 2024-08-19 ENCOUNTER — HOSPITAL ENCOUNTER (OUTPATIENT)
Facility: AMBULATORY SURGERY CENTER | Age: 74
Discharge: HOME | End: 2024-08-19
Attending: SURGERY
Payer: MEDICARE

## 2024-08-19 ENCOUNTER — ANESTHESIA (OUTPATIENT)
Dept: SURGERY | Facility: AMBULATORY SURGERY CENTER | Age: 74
End: 2024-08-19
Payer: MEDICARE

## 2024-08-19 VITALS
OXYGEN SATURATION: 96 % | SYSTOLIC BLOOD PRESSURE: 154 MMHG | DIASTOLIC BLOOD PRESSURE: 67 MMHG | RESPIRATION RATE: 16 BRPM | BODY MASS INDEX: 30.4 KG/M2 | HEART RATE: 67 BPM | TEMPERATURE: 96.3 F | HEIGHT: 61 IN | WEIGHT: 161 LBS

## 2024-08-19 DIAGNOSIS — K92.1 BLACK STOOL: ICD-10-CM

## 2024-08-19 DIAGNOSIS — Z87.11 HISTORY OF BLEEDING PEPTIC ULCER: ICD-10-CM

## 2024-08-19 LAB — UPPER GI ENDOSCOPY: NORMAL

## 2024-08-19 RX ORDER — LIDOCAINE 40 MG/G
CREAM TOPICAL
Status: DISCONTINUED | OUTPATIENT
Start: 2024-08-19 | End: 2024-08-20 | Stop reason: HOSPADM

## 2024-08-19 RX ORDER — GLYCOPYRROLATE 0.2 MG/ML
INJECTION, SOLUTION INTRAMUSCULAR; INTRAVENOUS PRN
Status: DISCONTINUED | OUTPATIENT
Start: 2024-08-19 | End: 2024-08-19

## 2024-08-19 RX ORDER — ONDANSETRON 4 MG/1
4 TABLET, ORALLY DISINTEGRATING ORAL EVERY 30 MIN PRN
Status: DISCONTINUED | OUTPATIENT
Start: 2024-08-19 | End: 2024-08-20 | Stop reason: HOSPADM

## 2024-08-19 RX ORDER — SODIUM CHLORIDE, SODIUM LACTATE, POTASSIUM CHLORIDE, CALCIUM CHLORIDE 600; 310; 30; 20 MG/100ML; MG/100ML; MG/100ML; MG/100ML
INJECTION, SOLUTION INTRAVENOUS CONTINUOUS
Status: DISCONTINUED | OUTPATIENT
Start: 2024-08-19 | End: 2024-08-20 | Stop reason: HOSPADM

## 2024-08-19 RX ORDER — ONDANSETRON 2 MG/ML
4 INJECTION INTRAMUSCULAR; INTRAVENOUS EVERY 30 MIN PRN
Status: DISCONTINUED | OUTPATIENT
Start: 2024-08-19 | End: 2024-08-20 | Stop reason: HOSPADM

## 2024-08-19 RX ORDER — DEXAMETHASONE SODIUM PHOSPHATE 4 MG/ML
4 INJECTION, SOLUTION INTRA-ARTICULAR; INTRALESIONAL; INTRAMUSCULAR; INTRAVENOUS; SOFT TISSUE
Status: DISCONTINUED | OUTPATIENT
Start: 2024-08-19 | End: 2024-08-20 | Stop reason: HOSPADM

## 2024-08-19 RX ORDER — LIDOCAINE HYDROCHLORIDE 20 MG/ML
INJECTION, SOLUTION INFILTRATION; PERINEURAL PRN
Status: DISCONTINUED | OUTPATIENT
Start: 2024-08-19 | End: 2024-08-19

## 2024-08-19 RX ORDER — NALOXONE HYDROCHLORIDE 0.4 MG/ML
0.1 INJECTION, SOLUTION INTRAMUSCULAR; INTRAVENOUS; SUBCUTANEOUS
Status: DISCONTINUED | OUTPATIENT
Start: 2024-08-19 | End: 2024-08-20 | Stop reason: HOSPADM

## 2024-08-19 RX ORDER — PROPOFOL 10 MG/ML
INJECTION, EMULSION INTRAVENOUS CONTINUOUS PRN
Status: DISCONTINUED | OUTPATIENT
Start: 2024-08-19 | End: 2024-08-19

## 2024-08-19 RX ORDER — PROPOFOL 10 MG/ML
INJECTION, EMULSION INTRAVENOUS PRN
Status: DISCONTINUED | OUTPATIENT
Start: 2024-08-19 | End: 2024-08-19

## 2024-08-19 RX ADMIN — SODIUM CHLORIDE, SODIUM LACTATE, POTASSIUM CHLORIDE, CALCIUM CHLORIDE: 600; 310; 30; 20 INJECTION, SOLUTION INTRAVENOUS at 09:18

## 2024-08-19 RX ADMIN — PROPOFOL 200 MCG/KG/MIN: 10 INJECTION, EMULSION INTRAVENOUS at 09:44

## 2024-08-19 RX ADMIN — PROPOFOL 50 MG: 10 INJECTION, EMULSION INTRAVENOUS at 09:44

## 2024-08-19 RX ADMIN — GLYCOPYRROLATE 0.2 MG: 0.2 INJECTION, SOLUTION INTRAMUSCULAR; INTRAVENOUS at 09:42

## 2024-08-19 RX ADMIN — LIDOCAINE HYDROCHLORIDE 3 ML: 20 INJECTION, SOLUTION INFILTRATION; PERINEURAL at 09:42

## 2024-08-19 NOTE — ANESTHESIA PREPROCEDURE EVALUATION
Anesthesia Pre-Procedure Evaluation    Patient: Cher Rae   MRN: [id number] : 1950        Procedure : Procedure(s):  ESOPHAGOGASTRODUODENOSCOPY, WITH BIOPSY          Past Medical History:   Diagnosis Date    Anemia     Cerebral artery occlusion with cerebral infarction (H)     No residual from stroke    Chronic Gout     Gastroesophageal reflux disease     History of blood transfusion     Hypertension     Obese       Past Surgical History:   Procedure Laterality Date    COLONOSCOPY      COLONOSCOPY N/A 2021    Procedure: COLONOSCOPY;  Surgeon: George Saenz MD;  Location: [facility]      No Known Allergies   Social History     Tobacco Use    Smoking status: Never     Passive exposure: Never    Smokeless tobacco: Never   Substance Use Topics    Alcohol use: Not Currently      Wt Readings from Last 1 Encounters:   24 73 kg (161 lb)        Anesthesia Evaluation   Pt has had prior anesthetic.     No history of anesthetic complications       ROS/MED HX  ENT/Pulmonary:  - neg pulmonary ROS     Neurologic:     (+)          CVA,  without deficits,                    Cardiovascular:     (+) Dyslipidemia hypertension- -   -  - -                                      METS/Exercise Tolerance:     Hematologic:  - neg hematologic  ROS     Musculoskeletal: Comment: Gout      GI/Hepatic:     (+) GERD, Asymptomatic on medication,                  Renal/Genitourinary:     (+) renal disease, type: CRI, Pt does not require dialysis,           Endo:     (+)               Obesity,       Psychiatric/Substance Use:  - neg psychiatric ROS     Infectious Disease:  - neg infectious disease ROS     Malignancy:  - neg malignancy ROS     Other:  - neg other ROS          Physical Exam    Airway  airway exam normal      Mallampati: III   TM distance: > 3 FB   Neck ROM: full   Mouth opening: > 3 cm    Respiratory Devices and Support         Dental  no notable dental history     (+) Minor Abnormalities - some fillings,  "tiny chips      Cardiovascular   cardiovascular exam normal       Rhythm and rate: regular and normal     Pulmonary   pulmonary exam normal        breath sounds clear to auscultation           OUTSIDE LABS:  CBC:   Lab Results   Component Value Date    WBC 6.4 08/10/2024    HGB 13.4 08/10/2024    HGB 13.0 (L) 07/25/2024    HCT 39.5 (L) 08/10/2024     08/10/2024     BMP:   Lab Results   Component Value Date     08/10/2024     05/07/2024    POTASSIUM 4.5 08/10/2024    POTASSIUM 3.6 05/07/2024    CHLORIDE 102 08/10/2024    CHLORIDE 107 05/07/2024    CO2 23 08/10/2024    CO2 25 05/07/2024    BUN 22.0 08/10/2024    BUN 16 05/07/2024    CR 1.37 (H) 08/10/2024    CR 1.20 05/07/2024     (H) 08/10/2024     (H) 05/07/2024     COAGS: No results found for: \"PTT\", \"INR\", \"FIBR\"  POC: No results found for: \"BGM\", \"HCG\", \"HCGS\"  HEPATIC:   Lab Results   Component Value Date    ALBUMIN 4.0 08/10/2024    PROTTOTAL 7.1 08/10/2024    ALT 48 08/10/2024    AST 37 08/10/2024    ALKPHOS 82 08/10/2024    BILITOTAL 0.2 08/10/2024     OTHER:   Lab Results   Component Value Date    MADONNA 9.2 08/10/2024    LIPASE 34 08/10/2024       Anesthesia Plan    ASA Status:  3    NPO Status:  NPO Appropriate    Anesthesia Type: MAC.     - Reason for MAC: straight local not clinically adequate              Consents    Anesthesia Plan(s) and associated risks, benefits, and realistic alternatives discussed. Questions answered and patient/representative(s) expressed understanding.     - Discussed:     - Discussed with:  Patient,             Postoperative Care    Pain management: IV analgesics, Oral pain medications, Multi-modal analgesia.   PONV prophylaxis: Ondansetron (or other 5HT-3), Dexamethasone or Solumedrol, Droperidol or Haldol     Comments:               Randall Dailey MD    I have reviewed the pertinent notes and labs in the chart from the past 30 days and (re)examined the patient.  Any updates or " "changes from those notes are reflected in this note.              # Obesity: Estimated body mass index is 30.79 kg/m  as calculated from the following:    Height as of this encounter: 1.54 m (5' 0.63\").    Weight as of this encounter: 73 kg (161 lb).      "

## 2024-08-19 NOTE — ANESTHESIA CARE TRANSFER NOTE
Patient: Cher Rae    Procedure: Procedure(s):  ESOPHAGOGASTRODUODENOSCOPY, WITH BIOPSIES       Diagnosis: Black stool [K92.1]  History of bleeding peptic ulcer [Z87.11]  Diagnosis Additional Information: No value filed.    Anesthesia Type:   MAC     Note:    Oropharynx: oropharynx clear of all foreign objects and spontaneously breathing  Level of Consciousness: drowsy  Oxygen Supplementation: room air    Independent Airway: airway patency satisfactory and stable  Dentition: dentition unchanged  Vital Signs Stable: post-procedure vital signs reviewed and stable  Report to RN Given: handoff report given  Patient transferred to: Phase II    Handoff Report: Identifed the Patient, Identified the Reponsible Provider, Reviewed the pertinent medical history, Discussed the surgical course, Reviewed Intra-OP anesthesia mangement and issues during anesthesia, Set expectations for post-procedure period and Allowed opportunity for questions and acknowledgement of understanding      Vitals:  Vitals Value Taken Time   /66 08/19/24 0957   Temp 96.3  F (35.7  C) 08/19/24 0957   Pulse 77 08/19/24 0957   Resp 20 08/19/24 0957   SpO2 95 % 08/19/24 0957       Electronically Signed By: SEN Zepeda CRNA  August 19, 2024  10:00 AM

## 2024-08-19 NOTE — ANESTHESIA POSTPROCEDURE EVALUATION
Patient: Cher Rae    Procedure: Procedure(s):  ESOPHAGOGASTRODUODENOSCOPY, WITH BIOPSIES       Anesthesia Type:  MAC    Note:  Disposition: Outpatient   Postop Pain Control: Uneventful            Sign Out: Well controlled pain   PONV: No   Neuro/Psych: Uneventful            Sign Out: Acceptable/Baseline neuro status   Airway/Respiratory: Uneventful            Sign Out: Acceptable/Baseline resp. status   CV/Hemodynamics: Uneventful            Sign Out: Acceptable CV status; No obvious hypovolemia; No obvious fluid overload   Other NRE: NONE   DID A NON-ROUTINE EVENT OCCUR? No           Last vitals:  Vitals Value Taken Time   /67 08/19/24 1030   Temp 96.3  F (35.7  C) 08/19/24 0957   Pulse 65 08/19/24 1036   Resp 16 08/19/24 1030   SpO2 98 % 08/19/24 1036   Vitals shown include unfiled device data.    Electronically Signed By: Randall Dailey MD  August 19, 2024  1:32 PM

## 2024-08-19 NOTE — DISCHARGE INSTRUCTIONS
If you have any questions or concerns regarding your procedure, please contact Dr. Craig, his office number is [phone].    Upper GI Endoscopy: What to Expect at Home  Your Recovery  You had an upper GI endoscopy. Your doctor used a thin, lighted tube that bends to look at the inside of your esophagus, your stomach, and the first part of the small intestine, called the duodenum.  After you have an endoscopy, you will stay at the hospital or clinic for 1 to 2 hours. This will allow the medicine to wear off. You will be able to go home after your doctor or nurse checks to make sure that you're not having any problems.  You may have to stay overnight if you had treatment during the test. You may have a sore throat for a day or two after the test.  This care sheet gives you a general idea about what to expect after the test.  How can you care for yourself at home?  Activity   Rest as much as you need to after you go home.  You should be able to go back to your usual activities the day after the test.  Diet   Follow your doctor's directions for eating after the test.  Drink plenty of fluids (unless your doctor has told you not to).  Medications   If you have a sore throat the day after the test, use an over-the-counter spray to numb your throat.  Follow-up care is a key part of your treatment and safety. Be sure to make and go to all appointments, and call your doctor if you are having problems. It's also a good idea to know your test results and keep a list of the medicines you take.  When should you call for help?   Call 911 anytime you think you may need emergency care. For example, call if:    You passed out (lost consciousness).     You have trouble breathing.     You pass maroon or bloody stools.   Call your doctor now or seek immediate medical care if:    You have pain that does not get better after your take pain medicine.     You have new or worse belly pain.     You have blood in your stools.     You are  "sick to your stomach and cannot keep fluids down.     You have a fever.     You cannot pass stools or gas.   Watch closely for changes in your health, and be sure to contact your doctor if:    Your throat still hurts after a day or two.     You do not get better as expected.   Where can you learn more?  Go to https://www.[employer].net/patiented  Enter J454 in the search box to learn more about \"Upper GI Endoscopy: What to Expect at Home.\"  Current as of: October 19, 2023               Content Version: 14.0    7673-0078 [employer].   Care instructions adapted under license by your healthcare professional. If you have questions about a medical condition or this instruction, always ask your healthcare professional. [employer] disclaims any warranty or liability for your use of this information.      [facility]   Adult Discharge Orders & Instructions     For 24 hours after surgery    Get plenty of rest.  A responsible adult must stay with you for at least 24 hours after you leave the hospital.   Do not drive or use heavy equipment.  If you have weakness or tingling, don't drive or use heavy equipment until this feeling goes away.  Do not drink alcohol.  Avoid strenuous or risky activities.  Ask for help when climbing stairs.   You may feel lightheaded.  IF so, sit for a few minutes before standing.  Have someone help you get up.   If you have nausea (feel sick to your stomach): Drink only clear liquids such as apple juice, ginger ale, broth or 7-Up.  Rest may also help.  Be sure to drink enough fluids.  Move to a regular diet as you feel able.  You may have a slight fever. Call the doctor if your fever is over 100 F (37.7 C) (taken under the tongue) or lasts longer than 24 hours.  You may have a dry mouth, a sore throat, muscle aches or trouble sleeping.  These should go away after 24 hours.  Do not make important or legal decisions.   Call your doctor for any of the " followin.  Signs of infection (fever, growing tenderness at the surgery site, a large amount of drainage or bleeding, severe pain, foul-smelling drainage, redness, swelling).    2. It has been over 8 to 10 hours since surgery and you are still not able to urinate (pass water).    3.  Headache for over 24 hours.

## 2024-08-19 NOTE — INTERVAL H&P NOTE
"I have reviewed the surgical (or preoperative) H&P that is linked to this encounter, and examined the patient. There are no significant changes    Steven Craig MD, [facility]  Office: [phone]  [facility]   General and Bariatric Surgery      Clinical Conditions Present on Arrival:  Clinically Significant Risk Factors Present on Admission                      # Obesity: Estimated body mass index is 30.79 kg/m  as calculated from the following:    Height as of this encounter: 1.54 m (5' 0.63\").    Weight as of this encounter: 73 kg (161 lb).       "

## 2024-08-20 ENCOUNTER — OFFICE VISIT (OUTPATIENT)
Dept: FAMILY MEDICINE | Facility: CLINIC | Age: 74
End: 2024-08-20
Payer: MEDICARE

## 2024-08-20 VITALS
DIASTOLIC BLOOD PRESSURE: 72 MMHG | WEIGHT: 159 LBS | BODY MASS INDEX: 30.02 KG/M2 | RESPIRATION RATE: 18 BRPM | TEMPERATURE: 98.1 F | HEART RATE: 77 BPM | HEIGHT: 61 IN | SYSTOLIC BLOOD PRESSURE: 120 MMHG | OXYGEN SATURATION: 95 %

## 2024-08-20 DIAGNOSIS — J30.1 SEASONAL ALLERGIC RHINITIS DUE TO POLLEN: ICD-10-CM

## 2024-08-20 DIAGNOSIS — K27.9 PEPTIC ULCER: Primary | ICD-10-CM

## 2024-08-20 PROCEDURE — 99213 OFFICE O/P EST LOW 20 MIN: CPT | Performed by: FAMILY MEDICINE

## 2024-08-20 RX ORDER — AZELASTINE HYDROCHLORIDE 0.5 MG/ML
1 SOLUTION/ DROPS OPHTHALMIC 2 TIMES DAILY PRN
Qty: 6 ML | Refills: 3 | Status: SHIPPED | OUTPATIENT
Start: 2024-08-20

## 2024-08-20 ASSESSMENT — ENCOUNTER SYMPTOMS: CONSTITUTIONAL NEGATIVE: 1

## 2024-08-20 NOTE — PROGRESS NOTES
Assessment and Plan     1. Peptic ulcer  We reviewed the pictures from his EGD yesterday.  We pointed out the ulcer and discussed its causes.  Await pathology results and discussion with Dr. Craig.  He knows to contact us if he has any issues.  Discussed the possibility of long-term acid suppression.  Answered all of his questions.    2. Seasonal allergic rhinitis due to pollen  Refilled.  - azelastine (OPTIVAR) 0.05 % ophthalmic solution; Place 1 drop into both eyes 2 times daily as needed (Itchy eyes)  Dispense: 6 mL; Refill: 3    26 minutes spent on the date of the encounter doing chart review, history and exam, documentation, and further activities as noted above.    The longitudinal plan of care for the diagnosis(es)/condition(s) as documented were addressed during this visit. Due to the added complexity in care, I will continue to support Cher in the subsequent management and with ongoing continuity of care.    Options for treatment and follow-up care were reviewed with the patient and/or guardian. Cher Rae and/or guardian engaged in the decision making process and verbalized understanding of the options discussed and agreed with the final plan. I answered all of their questions.    This note was created with the aid of dictation software. Any mistakes are unintentional.    George Saenz III, MD, FAAFP  [facility]  08/20/24 2:28 PM           HPI:       Cher Rae is a 74 year old  male  Patient presents with:  Abdominal Pain: Results based on this endoscope. Stomach ulcers found, wants to follow-up from that      Patient wants to review the results of his EGD yesterday.  Overall, the pains and melena that he was having previously have almost completely resolved.  Is taking the  fate and omeprazole all the time and just the famotidine if needed.  Also has a previous bottle of omeprazole.  Does not totally understand what was shown on his EGD yesterday.    Needs a refill of  his allergy eyedrops.  They are very helpful.    Wife is going on a trip to [place] soon and leaving him in the house here.    A [employer]  was used for this visit         PMHX:     Patient Active Problem List   Diagnosis    Chronic Gout    Hyperlipidemia    Cerebrovascular disease    Benign Polyps Of The Large Intestine    Joint Pain, Localized In The Left Shoulder    Hyperlipidemia    Blurry Vision    Benign essential hypertension    Seasonal allergic rhinitis due to pollen    Peptic ulcer       Current Outpatient Medications   Medication Sig Dispense Refill    azelastine (OPTIVAR) 0.05 % ophthalmic solution Place 1 drop into both eyes 2 times daily as needed (Itchy eyes) 6 mL 3    acetaminophen (TYLENOL) 500 MG tablet Take 2 tablets (1,000 mg) by mouth 3 times daily 90 tablet 0    acetaminophen (TYLENOL) 500 MG tablet Take 1-2 tablets (500-1,000 mg) by mouth every 6 hours as needed for mild pain 100 tablet 3    allopurinol (ZYLOPRIM) 100 MG tablet Take 2 tablets (200 mg) by mouth daily 180 tablet 3    atorvastatin (LIPITOR) 40 MG tablet Take 1 tablet (40 mg) by mouth daily 90 tablet 3    famotidine (PEPCID) 20 MG tablet Take 1 tablet (20 mg) by mouth 2 times daily as needed (upper abdominal pain) 30 tablet 0    fluticasone (FLONASE) 50 MCG/ACT nasal spray Spray 2 sprays into both nostrils daily 48 g 3    lisinopril (ZESTRIL) 10 MG tablet Take 1 tablet (10 mg) by mouth daily 90 tablet 1    omeprazole (PRILOSEC) 20 MG DR capsule Take 1 capsule (20 mg) by mouth daily 30 capsule 0    polyethylene glycol (MIRALAX) 17 GM/Dose powder Take 17 g (1 Capful) by mouth 2 times daily 238 g 1    sucralfate (CARAFATE) 1 GM tablet Take 1 tablet (1 g) by mouth 4 times daily (before meals and nightly) for 14 days 56 tablet 0       Social History     Socioeconomic History    Marital status:      Spouse name: Not on file    Number of children: Not on file    Years of education: Not on file    Highest education level:  "Not on file   Occupational History    Not on file   Tobacco Use    Smoking status: Never     Passive exposure: Never    Smokeless tobacco: Never   Vaping Use    Vaping status: Never Used   Substance and Sexual Activity    Alcohol use: Not Currently    Drug use: Not Currently    Sexual activity: Not on file   Other Topics Concern    Not on file   Social History Narrative    Has been back forth from [place] and [place] across the years. He originally immigrated to [place]. Has been back in [place] since summer 2020.        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        He really likes to write. Writes lots of things about ong history.        Has 11 children (one ) and 15 grandchildren (9 in [place]).        Retired.        Likes to play the flute. Gardens ong herbs and cucumbers especially.     Social Determinants of Health     Financial Resource Strain: Not on file   Food Insecurity: Not on file   Transportation Needs: Not on file   Physical Activity: Not on file   Stress: Not on file   Social Connections: Not on file   Interpersonal Safety: Low Risk  (2024)    Interpersonal Safety     Do you feel physically and emotionally safe where you currently live?: Yes     Within the past 12 months, have you been hit, slapped, kicked or otherwise physically hurt by someone?: No     Within the past 12 months, have you been humiliated or emotionally abused in other ways by your partner or ex-partner?: No   Housing Stability: Not on file       No Known Allergies    No results found for this or any previous visit (from the past 24 hour(s)).         Review of Systems:     Review of Systems   Constitutional: Negative.             Physical Exam:     Vitals:    24 1402   BP: 120/72   BP Location: Right arm   Patient Position: Sitting   Pulse: 77   Resp: 18   Temp: 98.1  F (36.7  C)   TempSrc: Oral   SpO2: 95%   Weight: 72.1 kg (159 lb)   Height: 1.55 m (5' 1.02\")     Body mass index is 30.02 " kg/m .    Physical Exam  Vitals and nursing note reviewed.   Constitutional:       General: He is not in acute distress.     Appearance: Normal appearance. He is not ill-appearing, toxic-appearing or diaphoretic.   Pulmonary:      Effort: No respiratory distress.   Neurological:      Mental Status: He is alert and oriented to person, place, and time.

## 2024-09-01 DIAGNOSIS — J30.1 SEASONAL ALLERGIC RHINITIS DUE TO POLLEN: ICD-10-CM

## 2024-09-03 RX ORDER — FLUTICASONE PROPIONATE 50 MCG
2 SPRAY, SUSPENSION (ML) NASAL DAILY
Qty: 48 G | Refills: 0 | Status: SHIPPED | OUTPATIENT
Start: 2024-09-03

## 2024-09-17 ENCOUNTER — OFFICE VISIT (OUTPATIENT)
Dept: FAMILY MEDICINE | Facility: CLINIC | Age: 74
End: 2024-09-17
Payer: MEDICARE

## 2024-09-17 VITALS
SYSTOLIC BLOOD PRESSURE: 128 MMHG | DIASTOLIC BLOOD PRESSURE: 77 MMHG | HEIGHT: 61 IN | WEIGHT: 156 LBS | HEART RATE: 62 BPM | OXYGEN SATURATION: 98 % | BODY MASS INDEX: 29.45 KG/M2

## 2024-09-17 DIAGNOSIS — Z00.00 WELLNESS EXAMINATION: Primary | ICD-10-CM

## 2024-09-17 DIAGNOSIS — Z00.00 MEDICARE ANNUAL WELLNESS VISIT, SUBSEQUENT: Primary | ICD-10-CM

## 2024-09-17 DIAGNOSIS — N52.9 VASCULOGENIC ERECTILE DYSFUNCTION, UNSPECIFIED VASCULOGENIC ERECTILE DYSFUNCTION TYPE: ICD-10-CM

## 2024-09-17 DIAGNOSIS — N18.31 CHRONIC KIDNEY DISEASE, STAGE 3A (H): ICD-10-CM

## 2024-09-17 PROCEDURE — 99213 OFFICE O/P EST LOW 20 MIN: CPT | Mod: 25 | Performed by: FAMILY MEDICINE

## 2024-09-17 PROCEDURE — G0439 PPPS, SUBSEQ VISIT: HCPCS | Performed by: FAMILY MEDICINE

## 2024-09-17 PROCEDURE — 99207 PR NO BILLABLE SERVICE THIS VISIT: CPT

## 2024-09-17 NOTE — PROGRESS NOTES
"Preventive Care Visit  [facility]  George Saenz MD, Family Medicine  Sep 17, 2024      Assessment & Plan     1. Medicare annual wellness visit, subsequent  Doing well and screenings are normal.  Up-to-date on preventative maintenance.  Discussed healthy living.  Discussed benefits of advance directives and reviewed, briefly, the honoring choices document that he was given.  Return to clinic when he has it completed.    2. Vasculogenic erectile dysfunction, unspecified vasculogenic erectile dysfunction type  Discussed the things that can cause sexual dysfunction and relationships.  We discussed that there really are not good medicines to increase desire in relationships but that there are medications for erectile dysfunction.  It seems like he is experiencing intermittent erectile dysfunction but not interested in medicines at this time.  Also has a prior history of some nocturia for which she does not want treatment at this time either.  However, in the future he may be a candidate for daily Cialis for both.  We discussed that sometimes medications for erectile dysfunction are not covered by insurance.  Again, offered a prescription he is not interested at this time.    3. Chronic kidney disease, stage 3a (H)  Blood pressure currently controlled and on an ACE inhibitor.  Reviewed his kidney function and normal kidney function changes across time.  No changes to therapies today.  Plan for urine microalbumin in the future.    Continues off aspirin after repeat gastric ulcer this year.  This is at least his second gastric ulcer from aspirin even on PPI therapy.  Continue PPI.    BMI  Estimated body mass index is 29.84 kg/m  as calculated from the following:    Height as of this encounter: 1.54 m (5' 0.63\").    Weight as of this encounter: 70.8 kg (156 lb).       Counseling  Appropriate preventive services were addressed with this patient via screening, questionnaire, or discussion as " appropriate for fall prevention, nutrition, physical activity, Tobacco-use cessation, social engagement, weight loss and cognition.  Checklist reviewing preventive services available has been given to the patient.  Reviewed patient's diet, addressing concerns and/or questions.   He is at risk for lack of exercise and has been provided with information to increase physical activity for the benefit of his well-being.   The patient was instructed to see the dentist every 6 months.   Discussed possible causes of fatigue. The patient was provided with written information regarding signs of hearing loss.   Information on urinary incontinence and treatment options given to patient.       No follow-ups on file.    Bert Kwon is a 74 year old, presenting for the following:  Wellness Visit        9/17/2024     2:18 PM   Additional Questions   Roomed by Alicja SOTOMAYOR   Accompanied by self         9/17/2024    Information    services provided? Yes   Language Hmong   Type of interpretation provided Face-to-face    name Frankie Paulson    Agency [employer]            Health Care Directive  Patient does not have a Health Care Directive or Living Will: Discussed advance care planning with patient; information given to patient to review.    HPI    Overall, feels very well and fortunate.    He has had conversations with his family, especially his wife, around general end of life wishes.  He does not want to be heavily intervened on should he get very sick with little chance of recovery.  He does not want to be a vegetable.  He has not gotten any paperwork completed.  He has several children in healthcare and is also had discussions with them.    He notes that his sexual relationship with his wife has changed across time.  Has questions about medicines that could be available for this.  He notes that sometimes he is able to have an erection capable of performing vaginal intercourse but not every  time.          9/17/2024   General Health   How would you rate your overall physical health? Good   Feel stress (tense, anxious, or unable to sleep) Not at all            9/17/2024   Nutrition   Diet: Low salt    Low fat/cholesterol       Multiple values from one day are sorted in reverse-chronological order         9/17/2024   Exercise   Days per week of moderate/strenous exercise 3 days            9/17/2024   Social Factors   Frequency of gathering with friends or relatives Once a week   Worry food won't last until get money to buy more No   Food not last or not have enough money for food? No   Do you have housing? (Housing is defined as stable permanent housing and does not include staying ouside in a car, in a tent, in an abandoned building, in an overnight shelter, or couch-surfing.) Yes   Are you worried about losing your housing? No   Lack of transportation? No   Unable to get utilities (heat,electricity)? No            9/17/2024   Fall Risk   Fallen 2 or more times in the past year? No   Trouble with walking or balance? No             9/17/2024   Activities of Daily Living- Home Safety   Needs help with the following daily activites None of the above   Safety concerns in the home None of the above            9/17/2024   Dental   Dentist two times every year? (!) NO            9/17/2024   Hearing Screening   Hearing concerns? (!) I NEED TO ASK PEOPLE TO SPEAK UP OR REPEAT THEMSELVES.    (!) IT'S HARD TO FOLLOW A CONVERSATION IN A NOISY RESTAURANT OR CROWDED ROOM.       Multiple values from one day are sorted in reverse-chronological order         9/17/2024   Driving Risk Screening   Patient/family members have concerns about driving No            9/17/2024   General Alertness/Fatigue Screening   Have you been more tired than usual lately? (!) YES            9/17/2024   Urinary Incontinence Screening   Bothered by leaking urine in past 6 months Yes            9/17/2024   TB Screening   Were you born outside of  the [place]? Yes            Today's PHQ-2 Score:       9/17/2024     2:13 PM   PHQ-2 ( 1999 [employer])   Q1: Little interest or pleasure in doing things 0   Q2: Feeling down, depressed or hopeless 0   PHQ-2 Score 0    0   Q1: Little interest or pleasure in doing things Not at all   Q2: Feeling down, depressed or hopeless Not at all   PHQ-2 Score 0           9/17/2024   Substance Use   Alcohol more than 3/day or more than 7/wk No   Do you have a current opioid prescription? No   How severe/bad is pain from 1 to 10? 4/10   Do you use any other substances recreationally? No        Social History     Tobacco Use    Smoking status: Never     Passive exposure: Never    Smokeless tobacco: Never   Vaping Use    Vaping status: Never Used   Substance Use Topics    Alcohol use: Not Currently    Drug use: Not Currently           9/17/2024   AAA Screening   Family history of Abdominal Aortic Aneurysm (AAA)? No      ASCVD Risk   The ASCVD Risk score (Devyn GRAYSON, et al., 2019) failed to calculate for the following reasons:    The patient has a prior MI or stroke diagnosis    Reviewed and updated as needed this visit by Provider                    Past Medical History:   Diagnosis Date    Anemia     Cerebral artery occlusion with cerebral infarction (H)     No residual from stroke    Chronic Gout     Gastroesophageal reflux disease     History of blood transfusion     Hypertension     Obese      Past Surgical History:   Procedure Laterality Date    COLONOSCOPY      COLONOSCOPY N/A 9/23/2021    Procedure: COLONOSCOPY;  Surgeon: George Saenz MD;  Location: [facility] OR    ESOPHAGOSCOPY, GASTROSCOPY, DUODENOSCOPY (EGD), COMBINED N/A 8/19/2024    Procedure: ESOPHAGOGASTRODUODENOSCOPY, WITH BIOPSIES;  Surgeon: Steven Craig MD;  Location: [facility] OR     Family History   Problem Relation Age of Onset    Cancer No family hx of     Diabetes No family hx of     Coronary Artery Disease No family hx of     Heart Disease No  "family hx of     Hypertension No family hx of        Current providers sharing in care for this patient include:  Patient Care Team:  George Saenz MD as PCP - General (Family Medicine)  George Saenz MD (Family Medicine)  George Saenz MD as Assigned PCP    The following health maintenance items are reviewed in Epic and correct as of today:  Health Maintenance   Topic Date Due    ADVANCE CARE PLANNING  Never done    MEDICARE ANNUAL WELLNESS VISIT  09/28/2022    COVID-19 Vaccine (8 - 2024-25 season) 09/01/2024    LIPID  04/23/2025    FALL RISK ASSESSMENT  09/17/2025    RSV VACCINE (1 - 1-dose 75+ series) 04/12/2027    GLUCOSE  08/10/2027    DTAP/TDAP/TD IMMUNIZATION (4 - Td or Tdap) 06/11/2028    COLORECTAL CANCER SCREENING  09/23/2031    HEPATITIS C SCREENING  Completed    PHQ-2 (once per calendar year)  Completed    INFLUENZA VACCINE  Completed    Pneumococcal Vaccine: 65+ Years  Completed    ZOSTER IMMUNIZATION  Completed    HPV IMMUNIZATION  Aged Out    MENINGITIS IMMUNIZATION  Aged Out    RSV MONOCLONAL ANTIBODY  Aged Out       Review of Systems   All other systems reviewed and are negative.        Objective    Exam  /77 (BP Location: Right arm, Patient Position: Sitting, Cuff Size: Adult Regular)   Pulse 62   Ht 1.54 m (5' 0.63\")   Wt 70.8 kg (156 lb)   SpO2 98%   BMI 29.84 kg/m     Estimated body mass index is 29.84 kg/m  as calculated from the following:    Height as of this encounter: 1.54 m (5' 0.63\").    Weight as of this encounter: 70.8 kg (156 lb).    Physical Exam  Vitals and nursing note reviewed.   Constitutional:       General: He is not in acute distress.     Appearance: Normal appearance. He is not ill-appearing, toxic-appearing or diaphoretic.   Pulmonary:      Effort: No respiratory distress.   Neurological:      Mental Status: He is alert and oriented to person, place, and time.              9/17/2024   Mini Cog   Clock Draw Score 2 Normal   3 Item Recall 3 objects recalled "   Mini Cog Total Score 5                 Signed Electronically by: George Saenz MD

## 2024-09-18 ENCOUNTER — PATIENT OUTREACH (OUTPATIENT)
Dept: GASTROENTEROLOGY | Facility: CLINIC | Age: 74
End: 2024-09-18
Payer: MEDICARE

## 2024-11-18 DIAGNOSIS — I10 BENIGN ESSENTIAL HYPERTENSION: ICD-10-CM

## 2024-11-18 RX ORDER — LISINOPRIL 10 MG/1
10 TABLET ORAL DAILY
Qty: 90 TABLET | Refills: 0 | Status: SHIPPED | OUTPATIENT
Start: 2024-11-18

## 2024-11-20 PROBLEM — N18.31 CHRONIC KIDNEY DISEASE, STAGE 3A (H): Chronic | Status: ACTIVE | Noted: 2024-09-17

## 2024-11-20 PROBLEM — J30.1 SEASONAL ALLERGIC RHINITIS DUE TO POLLEN: Chronic | Status: ACTIVE | Noted: 2022-03-16

## 2024-11-20 PROBLEM — I10 BENIGN ESSENTIAL HYPERTENSION: Chronic | Status: ACTIVE | Noted: 2022-03-16

## 2024-11-20 PROBLEM — Z86.73 HISTORY OF CVA (CEREBROVASCULAR ACCIDENT): Chronic | Status: ACTIVE | Noted: 2021-06-15

## 2024-11-20 PROBLEM — E78.5 HYPERLIPIDEMIA: Chronic | Status: ACTIVE | Noted: 2021-06-15

## 2025-02-26 DIAGNOSIS — I10 BENIGN ESSENTIAL HYPERTENSION: ICD-10-CM

## 2025-02-26 RX ORDER — LISINOPRIL 10 MG/1
10 TABLET ORAL DAILY
Qty: 90 TABLET | Refills: 0 | Status: SHIPPED | OUTPATIENT
Start: 2025-02-26

## 2025-04-09 DIAGNOSIS — E78.5 HYPERLIPIDEMIA, UNSPECIFIED HYPERLIPIDEMIA TYPE: ICD-10-CM

## 2025-04-09 RX ORDER — ATORVASTATIN CALCIUM 40 MG/1
40 TABLET, FILM COATED ORAL DAILY
Qty: 90 TABLET | Refills: 0 | Status: SHIPPED | OUTPATIENT
Start: 2025-04-09

## 2025-05-22 DIAGNOSIS — I10 BENIGN ESSENTIAL HYPERTENSION: ICD-10-CM

## 2025-05-22 DIAGNOSIS — M1A.9XX0 CHRONIC GOUTY ARTHROPATHY: ICD-10-CM

## 2025-05-27 RX ORDER — LISINOPRIL 10 MG/1
10 TABLET ORAL DAILY
Qty: 90 TABLET | Refills: 0 | Status: SHIPPED | OUTPATIENT
Start: 2025-05-27

## 2025-05-27 RX ORDER — ALLOPURINOL 100 MG/1
200 TABLET ORAL DAILY
Qty: 180 TABLET | Refills: 0 | Status: SHIPPED | OUTPATIENT
Start: 2025-05-27

## 2025-05-27 NOTE — TELEPHONE ENCOUNTER
GFR out of range at 55 and overdue for uric acid. Outside RN standing orders. Routing to PCP for review and fill. Carlos SOTOMAYOR

## 2025-09-02 DIAGNOSIS — J30.1 SEASONAL ALLERGIC RHINITIS DUE TO POLLEN: ICD-10-CM

## 2025-09-02 RX ORDER — AZELASTINE HYDROCHLORIDE 0.5 MG/ML
SOLUTION/ DROPS OPHTHALMIC
Qty: 6 ML | Refills: 0 | Status: SHIPPED | OUTPATIENT
Start: 2025-09-02

## 2025-09-02 RX ORDER — FLUTICASONE PROPIONATE 50 MCG
2 SPRAY, SUSPENSION (ML) NASAL DAILY
Qty: 48 G | Refills: 0 | Status: SHIPPED | OUTPATIENT
Start: 2025-09-02